# Patient Record
Sex: MALE | Race: WHITE | NOT HISPANIC OR LATINO | Employment: OTHER | URBAN - METROPOLITAN AREA
[De-identification: names, ages, dates, MRNs, and addresses within clinical notes are randomized per-mention and may not be internally consistent; named-entity substitution may affect disease eponyms.]

---

## 2023-05-08 DIAGNOSIS — F51.01 PRIMARY INSOMNIA: Primary | ICD-10-CM

## 2023-05-08 RX ORDER — HYDROXYZINE HYDROCHLORIDE 10 MG/1
TABLET, FILM COATED ORAL
Qty: 30 TABLET | Refills: 5 | Status: SHIPPED | OUTPATIENT
Start: 2023-05-08 | End: 2023-10-18 | Stop reason: ALTCHOICE

## 2023-05-18 DIAGNOSIS — I10 PRIMARY HYPERTENSION: Primary | ICD-10-CM

## 2023-05-18 RX ORDER — ATENOLOL 25 MG/1
TABLET ORAL
Qty: 90 TABLET | Refills: 3 | Status: SHIPPED | OUTPATIENT
Start: 2023-05-18 | End: 2023-07-26

## 2023-07-07 DIAGNOSIS — E78.2 MIXED HYPERLIPIDEMIA: Primary | ICD-10-CM

## 2023-07-10 RX ORDER — ATORVASTATIN CALCIUM 20 MG/1
TABLET, FILM COATED ORAL
Qty: 30 TABLET | Refills: 11 | Status: SHIPPED | OUTPATIENT
Start: 2023-07-10 | End: 2023-10-18 | Stop reason: SDUPTHER

## 2023-07-19 ASSESSMENT — ENCOUNTER SYMPTOMS
ORTHOPNEA: 0
SHORTNESS OF BREATH: 1
LEG PAIN: 0
HEMOPTYSIS: 0
HEADACHES: 0
FEVER: 0
NECK PAIN: 0
SPUTUM PRODUCTION: 0
RHINORRHEA: 0
SYNCOPE: 0
SWOLLEN GLANDS: 0
SORE THROAT: 0
ABDOMINAL PAIN: 0
WHEEZING: 1
CLAUDICATION: 0
PND: 0
VOMITING: 0

## 2023-07-25 PROBLEM — E03.9 HYPOTHYROIDISM (ACQUIRED): Status: ACTIVE | Noted: 2023-07-25

## 2023-07-25 PROBLEM — Z86.69 H/O GUILLAIN-BARRE SYNDROME: Status: ACTIVE | Noted: 2023-07-25

## 2023-07-25 PROBLEM — R60.0 EDEMA OF BOTH LEGS: Status: ACTIVE | Noted: 2023-07-25

## 2023-07-25 PROBLEM — I10 BENIGN ESSENTIAL HTN: Status: ACTIVE | Noted: 2023-07-25

## 2023-07-25 PROBLEM — M54.50 LOW BACK PAIN: Status: ACTIVE | Noted: 2023-07-25

## 2023-07-25 PROBLEM — I89.0 LYMPHEDEMA OF RIGHT ARM: Status: ACTIVE | Noted: 2023-07-25

## 2023-07-25 PROBLEM — G60.9 IDIOPATHIC PERIPHERAL NEUROPATHY: Status: ACTIVE | Noted: 2023-07-25

## 2023-07-25 PROBLEM — D64.9 ANEMIA: Status: ACTIVE | Noted: 2023-07-25

## 2023-07-25 PROBLEM — I50.9 CHF (CONGESTIVE HEART FAILURE) (MULTI): Status: ACTIVE | Noted: 2023-07-25

## 2023-07-25 PROBLEM — H90.3 BILATERAL SENSORINEURAL HEARING LOSS: Status: ACTIVE | Noted: 2023-07-25

## 2023-07-25 PROBLEM — M25.50 ARTHRALGIA OF MULTIPLE SITES: Status: ACTIVE | Noted: 2023-07-25

## 2023-07-25 PROBLEM — E78.5 HYPERLIPIDEMIA: Status: ACTIVE | Noted: 2023-07-25

## 2023-07-25 PROBLEM — G47.33 OBSTRUCTIVE SLEEP APNEA: Status: ACTIVE | Noted: 2023-07-25

## 2023-07-25 PROBLEM — G45.9 TIA (TRANSIENT ISCHEMIC ATTACK): Status: ACTIVE | Noted: 2023-07-25

## 2023-07-25 PROBLEM — R53.82 CHRONIC FATIGUE: Status: ACTIVE | Noted: 2023-07-25

## 2023-07-25 PROBLEM — Z86.73 H/O TIA (TRANSIENT ISCHEMIC ATTACK) AND STROKE: Status: ACTIVE | Noted: 2023-07-25

## 2023-07-25 PROBLEM — K21.9 GERD (GASTROESOPHAGEAL REFLUX DISEASE): Status: ACTIVE | Noted: 2023-07-25

## 2023-07-26 ENCOUNTER — OFFICE VISIT (OUTPATIENT)
Dept: PRIMARY CARE | Facility: CLINIC | Age: 88
End: 2023-07-26
Payer: MEDICARE

## 2023-07-26 VITALS
WEIGHT: 220.8 LBS | TEMPERATURE: 97.5 F | BODY MASS INDEX: 34.07 KG/M2 | SYSTOLIC BLOOD PRESSURE: 136 MMHG | DIASTOLIC BLOOD PRESSURE: 76 MMHG | OXYGEN SATURATION: 92 % | HEART RATE: 75 BPM

## 2023-07-26 DIAGNOSIS — E03.9 HYPOTHYROIDISM (ACQUIRED): ICD-10-CM

## 2023-07-26 DIAGNOSIS — I10 BENIGN ESSENTIAL HTN: ICD-10-CM

## 2023-07-26 DIAGNOSIS — I50.9 CONGESTIVE HEART FAILURE, UNSPECIFIED HF CHRONICITY, UNSPECIFIED HEART FAILURE TYPE (MULTI): Primary | ICD-10-CM

## 2023-07-26 DIAGNOSIS — K21.9 GASTROESOPHAGEAL REFLUX DISEASE WITHOUT ESOPHAGITIS: ICD-10-CM

## 2023-07-26 DIAGNOSIS — D64.9 ANEMIA, UNSPECIFIED TYPE: ICD-10-CM

## 2023-07-26 PROCEDURE — 3078F DIAST BP <80 MM HG: CPT | Performed by: FAMILY MEDICINE

## 2023-07-26 PROCEDURE — 3075F SYST BP GE 130 - 139MM HG: CPT | Performed by: FAMILY MEDICINE

## 2023-07-26 PROCEDURE — 1159F MED LIST DOCD IN RCRD: CPT | Performed by: FAMILY MEDICINE

## 2023-07-26 PROCEDURE — 1036F TOBACCO NON-USER: CPT | Performed by: FAMILY MEDICINE

## 2023-07-26 PROCEDURE — 99213 OFFICE O/P EST LOW 20 MIN: CPT | Performed by: FAMILY MEDICINE

## 2023-07-26 PROCEDURE — 1160F RVW MEDS BY RX/DR IN RCRD: CPT | Performed by: FAMILY MEDICINE

## 2023-07-26 RX ORDER — LEVOTHYROXINE SODIUM 100 UG/1
0.5 TABLET ORAL DAILY
COMMUNITY
Start: 2013-01-18 | End: 2024-03-04

## 2023-07-26 RX ORDER — LOSARTAN POTASSIUM 100 MG/1
1 TABLET ORAL DAILY
COMMUNITY
Start: 2014-05-05 | End: 2023-10-18 | Stop reason: SDUPTHER

## 2023-07-26 RX ORDER — AZELASTINE 1 MG/ML
SPRAY, METERED NASAL
COMMUNITY
Start: 2022-11-29

## 2023-07-26 RX ORDER — CLOPIDOGREL BISULFATE 75 MG/1
1 TABLET ORAL DAILY
COMMUNITY
Start: 2017-01-13 | End: 2023-10-18 | Stop reason: SDUPTHER

## 2023-07-26 RX ORDER — FLUTICASONE FUROATE, UMECLIDINIUM BROMIDE AND VILANTEROL TRIFENATATE 100; 62.5; 25 UG/1; UG/1; UG/1
1 POWDER RESPIRATORY (INHALATION)
COMMUNITY
Start: 2023-01-06

## 2023-07-26 RX ORDER — ASPIRIN 81 MG/1
81 TABLET ORAL
COMMUNITY
End: 2023-10-18 | Stop reason: SDUPTHER

## 2023-07-26 RX ORDER — OMEPRAZOLE 20 MG/1
1 CAPSULE, DELAYED RELEASE ORAL DAILY
COMMUNITY
End: 2023-10-18 | Stop reason: ALTCHOICE

## 2023-07-26 ASSESSMENT — ENCOUNTER SYMPTOMS
PND: 0
RHINORRHEA: 0
NECK PAIN: 0
ABDOMINAL PAIN: 0
HEMOPTYSIS: 0
SHORTNESS OF BREATH: 1
LOSS OF SENSATION IN FEET: 1
FEVER: 0
SYNCOPE: 0
LEG PAIN: 0
WHEEZING: 1
HEADACHES: 0
VOMITING: 0
DEPRESSION: 0
CLAUDICATION: 0
OCCASIONAL FEELINGS OF UNSTEADINESS: 1
SORE THROAT: 0
SPUTUM PRODUCTION: 0
ORTHOPNEA: 0
SWOLLEN GLANDS: 0

## 2023-07-26 ASSESSMENT — PATIENT HEALTH QUESTIONNAIRE - PHQ9
2. FEELING DOWN, DEPRESSED OR HOPELESS: NOT AT ALL
1. LITTLE INTEREST OR PLEASURE IN DOING THINGS: NOT AT ALL
SUM OF ALL RESPONSES TO PHQ9 QUESTIONS 1 AND 2: 0

## 2023-07-26 NOTE — PROGRESS NOTES
Subjective   Patient ID: Mic Saunders is a 88 y.o. male who presents for 6 month follow up.    He is here for 6 months follow-up on hypertension, CHF, GERD, hyperlipidemia, hypothyroidism, peripheral neuropathy, chronic lower back pain and obstructive sleep apnea.  In general he is feeling fine other than complaining of chronic fatigue.    Shortness of Breath  This is a chronic problem. The current episode started more than 1 year ago. The problem occurs daily. The problem has been unchanged. The average episode lasts 10 minutes. Associated symptoms include wheezing. Pertinent negatives include no abdominal pain, chest pain, claudication, coryza, ear pain, fever, headaches, hemoptysis, leg pain, leg swelling, neck pain, orthopnea, PND, rash, rhinorrhea, sore throat, sputum production, swollen glands, syncope or vomiting. The symptoms are aggravated by any activity.        Review of Systems   Constitutional:  Negative for fever.   HENT:  Negative for ear pain, rhinorrhea and sore throat.    Respiratory:  Positive for shortness of breath and wheezing. Negative for hemoptysis and sputum production.    Cardiovascular:  Negative for chest pain, orthopnea, claudication, leg swelling, syncope and PND.   Gastrointestinal:  Negative for abdominal pain and vomiting.   Musculoskeletal:  Negative for neck pain.   Skin:  Negative for rash.   Neurological:  Negative for headaches.       Objective   /76 (BP Location: Left arm, Patient Position: Sitting, BP Cuff Size: Adult)   Pulse 75   Temp 36.4 °C (97.5 °F) (Skin)   Wt 100 kg (220 lb 12.8 oz)   SpO2 92%   BMI 34.07 kg/m²     Physical Exam  Constitutional:       Appearance: Normal appearance.   HENT:      Head: Normocephalic and atraumatic.   Cardiovascular:      Rate and Rhythm: Normal rate and regular rhythm.   Pulmonary:      Effort: Pulmonary effort is normal.      Breath sounds: Normal breath sounds.   Musculoskeletal:      Cervical back: Normal range of motion and  neck supple.   Neurological:      General: No focal deficit present.      Mental Status: He is alert and oriented to person, place, and time.   Psychiatric:         Mood and Affect: Mood normal.         Behavior: Behavior normal.         Thought Content: Thought content normal.         Judgment: Judgment normal.         Assessment/Plan   Problem List Items Addressed This Visit       Anemia    Benign essential HTN    Relevant Orders    CBC    Lipid Panel    CHF (congestive heart failure) (CMS/[facility]) - Primary     Followed by cardiology         Relevant Medications    clopidogrel (Plavix) 75 mg tablet    Other Relevant Orders    Comprehensive Metabolic Panel    GERD (gastroesophageal reflux disease)    Hypothyroidism (acquired)    Relevant Orders    Thyroid Stimulating Hormone    Thyroxine, Free     In general he is doing well other than the chronic fatigue and he had blood work at his last visit.  He will follow-up in 6 months with one of the physicians here.

## 2023-08-14 ENCOUNTER — TELEPHONE (OUTPATIENT)
Dept: PRIMARY CARE | Facility: CLINIC | Age: 88
End: 2023-08-14
Payer: MEDICARE

## 2023-08-14 NOTE — TELEPHONE ENCOUNTER
Pt's son calling, states Mic is complaining of his legs, feet and hips hurting. He is on Plavix  and he took IB profen 600mg which did help but is a blood thinner, they want to know if there is anything else he can take to help w/ the pain that is not a blood thinner.

## 2023-10-18 ENCOUNTER — LAB (OUTPATIENT)
Dept: LAB | Facility: LAB | Age: 88
End: 2023-10-18
Payer: MEDICARE

## 2023-10-18 ENCOUNTER — OFFICE VISIT (OUTPATIENT)
Dept: PRIMARY CARE | Facility: CLINIC | Age: 88
End: 2023-10-18
Payer: MEDICARE

## 2023-10-18 VITALS
OXYGEN SATURATION: 94 % | SYSTOLIC BLOOD PRESSURE: 156 MMHG | HEART RATE: 84 BPM | TEMPERATURE: 97.7 F | WEIGHT: 221.2 LBS | DIASTOLIC BLOOD PRESSURE: 58 MMHG | BODY MASS INDEX: 34.13 KG/M2

## 2023-10-18 DIAGNOSIS — E03.9 HYPOTHYROIDISM (ACQUIRED): ICD-10-CM

## 2023-10-18 DIAGNOSIS — Z86.73 H/O TIA (TRANSIENT ISCHEMIC ATTACK) AND STROKE: ICD-10-CM

## 2023-10-18 DIAGNOSIS — K21.9 GASTROESOPHAGEAL REFLUX DISEASE WITHOUT ESOPHAGITIS: ICD-10-CM

## 2023-10-18 DIAGNOSIS — D64.9 ANEMIA, UNSPECIFIED TYPE: Primary | ICD-10-CM

## 2023-10-18 DIAGNOSIS — D64.9 ANEMIA, UNSPECIFIED TYPE: ICD-10-CM

## 2023-10-18 DIAGNOSIS — I50.9 CONGESTIVE HEART FAILURE, UNSPECIFIED HF CHRONICITY, UNSPECIFIED HEART FAILURE TYPE (MULTI): ICD-10-CM

## 2023-10-18 DIAGNOSIS — I89.0 LYMPHEDEMA OF RIGHT ARM: ICD-10-CM

## 2023-10-18 DIAGNOSIS — I10 BENIGN ESSENTIAL HTN: ICD-10-CM

## 2023-10-18 DIAGNOSIS — E78.2 MIXED HYPERLIPIDEMIA: ICD-10-CM

## 2023-10-18 LAB
ANION GAP SERPL CALC-SCNC: 12 MMOL/L (ref 10–20)
BUN SERPL-MCNC: 20 MG/DL (ref 6–23)
CALCIUM SERPL-MCNC: 9.3 MG/DL (ref 8.6–10.3)
CHLORIDE SERPL-SCNC: 102 MMOL/L (ref 98–107)
CO2 SERPL-SCNC: 22 MMOL/L (ref 21–32)
CREAT SERPL-MCNC: 1.08 MG/DL (ref 0.5–1.3)
ERYTHROCYTE [DISTWIDTH] IN BLOOD BY AUTOMATED COUNT: 17 % (ref 11.5–14.5)
FERRITIN SERPL-MCNC: 25 NG/ML (ref 20–300)
GFR SERPL CREATININE-BSD FRML MDRD: 66 ML/MIN/1.73M*2
GLUCOSE SERPL-MCNC: 112 MG/DL (ref 74–99)
HCT VFR BLD AUTO: 40.1 % (ref 41–52)
HGB BLD-MCNC: 12.5 G/DL (ref 13.5–17.5)
IRON SATN MFR SERPL: 6 % (ref 25–45)
IRON SERPL-MCNC: 31 UG/DL (ref 35–150)
MCH RBC QN AUTO: 25.7 PG (ref 26–34)
MCHC RBC AUTO-ENTMCNC: 31.2 G/DL (ref 32–36)
MCV RBC AUTO: 83 FL (ref 80–100)
NRBC BLD-RTO: 0 /100 WBCS (ref 0–0)
PLATELET # BLD AUTO: 267 X10*3/UL (ref 150–450)
PMV BLD AUTO: 11.3 FL (ref 7.5–11.5)
POTASSIUM SERPL-SCNC: 4.3 MMOL/L (ref 3.5–5.3)
RBC # BLD AUTO: 4.86 X10*6/UL (ref 4.5–5.9)
SODIUM SERPL-SCNC: 132 MMOL/L (ref 136–145)
TIBC SERPL-MCNC: 480 UG/DL (ref 240–445)
TSH SERPL-ACNC: 3.03 MIU/L (ref 0.44–3.98)
UIBC SERPL-MCNC: 449 UG/DL (ref 110–370)
WBC # BLD AUTO: 9.6 X10*3/UL (ref 4.4–11.3)

## 2023-10-18 PROCEDURE — 82728 ASSAY OF FERRITIN: CPT

## 2023-10-18 PROCEDURE — 36415 COLL VENOUS BLD VENIPUNCTURE: CPT

## 2023-10-18 PROCEDURE — 85027 COMPLETE CBC AUTOMATED: CPT

## 2023-10-18 PROCEDURE — 1160F RVW MEDS BY RX/DR IN RCRD: CPT | Performed by: NURSE PRACTITIONER

## 2023-10-18 PROCEDURE — 1036F TOBACCO NON-USER: CPT | Performed by: NURSE PRACTITIONER

## 2023-10-18 PROCEDURE — 3078F DIAST BP <80 MM HG: CPT | Performed by: NURSE PRACTITIONER

## 2023-10-18 PROCEDURE — 83540 ASSAY OF IRON: CPT

## 2023-10-18 PROCEDURE — 99214 OFFICE O/P EST MOD 30 MIN: CPT | Performed by: NURSE PRACTITIONER

## 2023-10-18 PROCEDURE — 80048 BASIC METABOLIC PNL TOTAL CA: CPT

## 2023-10-18 PROCEDURE — 1159F MED LIST DOCD IN RCRD: CPT | Performed by: NURSE PRACTITIONER

## 2023-10-18 PROCEDURE — 3077F SYST BP >= 140 MM HG: CPT | Performed by: NURSE PRACTITIONER

## 2023-10-18 PROCEDURE — 84443 ASSAY THYROID STIM HORMONE: CPT

## 2023-10-18 PROCEDURE — 83550 IRON BINDING TEST: CPT

## 2023-10-18 RX ORDER — CEPHALEXIN 500 MG/1
1 CAPSULE ORAL 3 TIMES DAILY
COMMUNITY
Start: 2019-12-03 | End: 2024-01-08 | Stop reason: ALTCHOICE

## 2023-10-18 RX ORDER — LOSARTAN POTASSIUM 100 MG/1
100 TABLET ORAL DAILY
Qty: 90 TABLET | Refills: 1 | Status: SHIPPED | OUTPATIENT
Start: 2023-10-18 | End: 2024-01-12

## 2023-10-18 RX ORDER — ASPIRIN 81 MG/1
81 TABLET ORAL
Qty: 90 TABLET | Refills: 1 | Status: SHIPPED | OUTPATIENT
Start: 2023-10-18

## 2023-10-18 RX ORDER — PANTOPRAZOLE SODIUM 40 MG/1
40 TABLET, DELAYED RELEASE ORAL DAILY
COMMUNITY
End: 2023-10-18 | Stop reason: SDUPTHER

## 2023-10-18 RX ORDER — ATORVASTATIN CALCIUM 20 MG/1
20 TABLET, FILM COATED ORAL DAILY
Qty: 90 TABLET | Refills: 1 | Status: SHIPPED | OUTPATIENT
Start: 2023-10-18

## 2023-10-18 RX ORDER — PANTOPRAZOLE SODIUM 40 MG/1
40 TABLET, DELAYED RELEASE ORAL DAILY
Qty: 90 TABLET | Refills: 1 | Status: SHIPPED | OUTPATIENT
Start: 2023-10-18 | End: 2024-03-19 | Stop reason: SDUPTHER

## 2023-10-18 RX ORDER — CLOPIDOGREL BISULFATE 75 MG/1
75 TABLET ORAL DAILY
Qty: 90 TABLET | Refills: 1 | Status: SHIPPED | OUTPATIENT
Start: 2023-10-18 | End: 2024-01-12

## 2023-10-18 ASSESSMENT — ENCOUNTER SYMPTOMS
CHILLS: 0
DIARRHEA: 0
FATIGUE: 1
ABDOMINAL PAIN: 0
DIZZINESS: 0
VOMITING: 0
FEVER: 0
CHEST TIGHTNESS: 0
COUGH: 0
NUMBNESS: 0
HEADACHES: 0
NAUSEA: 0
PALPITATIONS: 0

## 2023-10-18 ASSESSMENT — PATIENT HEALTH QUESTIONNAIRE - PHQ9
SUM OF ALL RESPONSES TO PHQ9 QUESTIONS 1 AND 2: 0
2. FEELING DOWN, DEPRESSED OR HOPELESS: NOT AT ALL
1. LITTLE INTEREST OR PLEASURE IN DOING THINGS: NOT AT ALL

## 2023-10-18 NOTE — PROGRESS NOTES
Subjective   Mic Saunders is a 88 y.o. male who presents for Transfer Of Care (From Dr Arevalo - Both shoulders have been giving him issues. He had his MCW in 01/2023 already.), Flu Vaccine (Pt denied), and Med Refill (Cephalexin ).    Med Refill  Associated symptoms include arthralgias, fatigue and weakness. Pertinent negatives include no abdominal pain, chest pain, chills, coughing, fever, headaches, nausea, numbness or vomiting.     He presents to the office today to establish care and medication refills and follow up.  He reports had GBS in 1976 2 weeks after Swine flu vaccine.  (+) fatigue  Tolerating medication well at current dose- no side effects. No chest pain, palpitations  (+) SOB with exertion-chronic  (+) chronic edema in feet. No headaches, weakness or vision changes.  (+) chronic numbness/tingling and pain in hands and feet since GBS  No dizziness.  Home blood pressure readings: 130's/60's  Last eye exam:1 year ago  Diet:Does not eat there healthiest  Exercise: limited due to weakness.  Weight: up about 10 lbs from January    He reports he has been having pain in the right side of neck into the shoulder.   Pain goes into the arm at times.  No numbness, tingling, weakness.  Tylenol helps at times.  Is not interested in PT today.    Last labs:     Follows with cardiology Dr. Bloom for PPM- had complete heart block.  Follows with Dr. Casillas for COPD    Review of Systems   Constitutional:  Positive for fatigue. Negative for chills and fever.   Eyes:  Negative for visual disturbance.   Respiratory:  Positive for shortness of breath. Negative for cough and chest tightness.    Cardiovascular:  Negative for chest pain, palpitations and leg swelling.   Gastrointestinal:  Negative for abdominal pain, diarrhea, nausea and vomiting.   Musculoskeletal:  Positive for arthralgias.   Neurological:  Positive for weakness. Negative for dizziness, numbness and headaches.       Objective   /58 (BP Location: Left  arm, Patient Position: Sitting)   Pulse 84   Temp 36.5 °C (97.7 °F) (Temporal)   Wt 100 kg (221 lb 3.2 oz)   SpO2 94%   BMI 34.13 kg/m²     Physical Exam  Constitutional:       General: He is not in acute distress.     Appearance: Normal appearance. He is not toxic-appearing.   Eyes:      Extraocular Movements: Extraocular movements intact.      Conjunctiva/sclera: Conjunctivae normal.      Pupils: Pupils are equal, round, and reactive to light.   Neck:      Vascular: No carotid bruit.   Cardiovascular:      Rate and Rhythm: Normal rate and regular rhythm.      Pulses: Normal pulses.      Heart sounds: Normal heart sounds, S1 normal and S2 normal. No murmur heard.     Comments: Trace edema noted to bilateral lower extremities  Pulmonary:      Effort: Pulmonary effort is normal. No respiratory distress.      Breath sounds: Normal breath sounds and air entry.   Abdominal:      General: Bowel sounds are normal.      Palpations: Abdomen is soft.      Tenderness: There is no abdominal tenderness.   Musculoskeletal:      Right lower leg: No edema.      Left lower leg: No edema.   Lymphadenopathy:      Cervical: No cervical adenopathy.   Neurological:      Mental Status: He is alert and oriented to person, place, and time.   Psychiatric:         Attention and Perception: Attention normal.         Mood and Affect: Mood and affect normal.         Behavior: Behavior normal. Behavior is cooperative.         Thought Content: Thought content normal.         Cognition and Memory: Cognition normal.         Judgment: Judgment normal.         Assessment/Plan   Problem List Items Addressed This Visit       Anemia - Primary     Recheck labs.  Last set of labs were after his permanent pacemaker placement.         Relevant Orders    CBC (Completed)    Iron and TIBC (Completed)    Ferritin (Completed)    Benign essential HTN     Elevated today but home readings have been good.          Relevant Medications    losartan (Cozaar) 100  mg tablet    Other Relevant Orders    Basic Metabolic Panel (Completed)    CHF (congestive heart failure) (CMS/HCC)     Clinically stable. Follows with cardiology.         Relevant Medications    clopidogrel (Plavix) 75 mg tablet    GERD (gastroesophageal reflux disease)     Fairly stable on Protonix.  Continue.         Relevant Medications    pantoprazole (ProtoNix) 40 mg EC tablet    H/O TIA (transient ischemic attack) and stroke    Relevant Medications    aspirin 81 mg EC tablet    atorvastatin (Lipitor) 20 mg tablet    clopidogrel (Plavix) 75 mg tablet    Hyperlipidemia     Continue statin.         Relevant Medications    atorvastatin (Lipitor) 20 mg tablet    Hypothyroidism (acquired)     Continue Levothyroxine at current dose. Check TSH.         Relevant Orders    TSH with reflex to Free T4 if abnormal (Completed)    Lymphedema of right arm       It has been a pleasure seeing you today!

## 2023-10-18 NOTE — PATIENT INSTRUCTIONS
Obtain the blood work as ordered today.   Plan to follow up in January for Annual Wellness Visit.

## 2023-10-19 ASSESSMENT — ENCOUNTER SYMPTOMS
WEAKNESS: 1
SHORTNESS OF BREATH: 1
ARTHRALGIAS: 1

## 2024-01-08 ENCOUNTER — OFFICE VISIT (OUTPATIENT)
Dept: PRIMARY CARE | Facility: CLINIC | Age: 89
End: 2024-01-08
Payer: MEDICARE

## 2024-01-08 VITALS
TEMPERATURE: 98 F | WEIGHT: 227.2 LBS | DIASTOLIC BLOOD PRESSURE: 60 MMHG | BODY MASS INDEX: 35.06 KG/M2 | OXYGEN SATURATION: 98 % | SYSTOLIC BLOOD PRESSURE: 136 MMHG | HEART RATE: 74 BPM

## 2024-01-08 DIAGNOSIS — E03.9 HYPOTHYROIDISM (ACQUIRED): ICD-10-CM

## 2024-01-08 DIAGNOSIS — M25.561 CHRONIC PAIN OF RIGHT KNEE: ICD-10-CM

## 2024-01-08 DIAGNOSIS — E66.01 OBESITY, MORBID (MULTI): ICD-10-CM

## 2024-01-08 DIAGNOSIS — E87.1 HYPONATREMIA: ICD-10-CM

## 2024-01-08 DIAGNOSIS — I10 BENIGN ESSENTIAL HTN: ICD-10-CM

## 2024-01-08 DIAGNOSIS — D64.9 ANEMIA, UNSPECIFIED TYPE: ICD-10-CM

## 2024-01-08 DIAGNOSIS — I89.0 LYMPHEDEMA OF RIGHT ARM: ICD-10-CM

## 2024-01-08 DIAGNOSIS — K21.9 GASTROESOPHAGEAL REFLUX DISEASE WITHOUT ESOPHAGITIS: ICD-10-CM

## 2024-01-08 DIAGNOSIS — G89.29 CHRONIC PAIN OF RIGHT KNEE: ICD-10-CM

## 2024-01-08 DIAGNOSIS — E78.5 HYPERLIPIDEMIA, UNSPECIFIED HYPERLIPIDEMIA TYPE: ICD-10-CM

## 2024-01-08 DIAGNOSIS — J44.9 CHRONIC OBSTRUCTIVE PULMONARY DISEASE, UNSPECIFIED COPD TYPE (MULTI): ICD-10-CM

## 2024-01-08 DIAGNOSIS — I50.9 CONGESTIVE HEART FAILURE, UNSPECIFIED HF CHRONICITY, UNSPECIFIED HEART FAILURE TYPE (MULTI): ICD-10-CM

## 2024-01-08 DIAGNOSIS — G47.33 OBSTRUCTIVE SLEEP APNEA: ICD-10-CM

## 2024-01-08 DIAGNOSIS — Z00.00 MEDICARE ANNUAL WELLNESS VISIT, SUBSEQUENT: Primary | ICD-10-CM

## 2024-01-08 PROBLEM — I51.7 LVH (LEFT VENTRICULAR HYPERTROPHY): Status: ACTIVE | Noted: 2018-02-13

## 2024-01-08 PROBLEM — N40.0 BPH (BENIGN PROSTATIC HYPERPLASIA): Status: RESOLVED | Noted: 2018-02-13 | Resolved: 2024-01-08

## 2024-01-08 PROBLEM — J42 CHRONIC BRONCHITIS (MULTI): Status: ACTIVE | Noted: 2024-01-08

## 2024-01-08 PROBLEM — N40.0 BPH (BENIGN PROSTATIC HYPERPLASIA): Status: ACTIVE | Noted: 2018-02-13

## 2024-01-08 PROBLEM — I51.89 DIASTOLIC DYSFUNCTION: Status: ACTIVE | Noted: 2018-02-13

## 2024-01-08 PROBLEM — J42 CHRONIC BRONCHITIS (MULTI): Status: RESOLVED | Noted: 2024-01-08 | Resolved: 2024-01-08

## 2024-01-08 PROCEDURE — 1159F MED LIST DOCD IN RCRD: CPT | Performed by: NURSE PRACTITIONER

## 2024-01-08 PROCEDURE — 1170F FXNL STATUS ASSESSED: CPT | Performed by: NURSE PRACTITIONER

## 2024-01-08 PROCEDURE — 1036F TOBACCO NON-USER: CPT | Performed by: NURSE PRACTITIONER

## 2024-01-08 PROCEDURE — 1160F RVW MEDS BY RX/DR IN RCRD: CPT | Performed by: NURSE PRACTITIONER

## 2024-01-08 PROCEDURE — 3078F DIAST BP <80 MM HG: CPT | Performed by: NURSE PRACTITIONER

## 2024-01-08 PROCEDURE — 3075F SYST BP GE 130 - 139MM HG: CPT | Performed by: NURSE PRACTITIONER

## 2024-01-08 PROCEDURE — G0439 PPPS, SUBSEQ VISIT: HCPCS | Performed by: NURSE PRACTITIONER

## 2024-01-08 PROCEDURE — 99214 OFFICE O/P EST MOD 30 MIN: CPT | Performed by: NURSE PRACTITIONER

## 2024-01-08 ASSESSMENT — ENCOUNTER SYMPTOMS
BLOOD IN STOOL: 0
FATIGUE: 1
PALPITATIONS: 0
NUMBNESS: 1
ARTHRALGIAS: 1
VOMITING: 0
DIZZINESS: 0
ABDOMINAL PAIN: 0
SHORTNESS OF BREATH: 1
COUGH: 0
DYSPHORIC MOOD: 0
FEVER: 0
CHEST TIGHTNESS: 0
WEAKNESS: 0
NAUSEA: 0
DIARRHEA: 0
HEADACHES: 0
CHILLS: 0

## 2024-01-08 ASSESSMENT — PATIENT HEALTH QUESTIONNAIRE - PHQ9
SUM OF ALL RESPONSES TO PHQ9 QUESTIONS 1 AND 2: 0
2. FEELING DOWN, DEPRESSED OR HOPELESS: NOT AT ALL
2. FEELING DOWN, DEPRESSED OR HOPELESS: NOT AT ALL
1. LITTLE INTEREST OR PLEASURE IN DOING THINGS: NOT AT ALL
SUM OF ALL RESPONSES TO PHQ9 QUESTIONS 1 AND 2: 0
1. LITTLE INTEREST OR PLEASURE IN DOING THINGS: NOT AT ALL

## 2024-01-08 ASSESSMENT — ACTIVITIES OF DAILY LIVING (ADL)
GROCERY_SHOPPING: TOTAL CARE
BATHING: INDEPENDENT
DOING_HOUSEWORK: TOTAL CARE
MANAGING_FINANCES: TOTAL CARE
TAKING_MEDICATION: NEEDS ASSISTANCE
DRESSING: NEEDS ASSISTANCE

## 2024-01-08 NOTE — PROGRESS NOTES
Subjective   Reason for Visit: Mic Saunders is an 89 y.o. male here for a Medicare Wellness visit.     Past Medical, Surgical, and Family History reviewed and updated in chart.    Reviewed all medications by prescribing practitioner or clinical pharmacist (such as prescriptions, OTCs, herbal therapies and supplements) and documented in the medical record.    HPI  He presents to the office today for AWV and follow up.  He is tolerating medications well at current dose- no side effects. No chest pain, palpitations or edema. (+) shortness of breath with exertion (at baseline.) No headaches, numbness, tingling, weakness or vision changes.  No dizziness.  Appetite has not been the best.    He reports he has had some pain in the right knee and leg for a couple years.  One day walked out into the kitchen and slipped on the wet floor landing directly on the right knee.  He reports he is able to bear full weight but painful.  Reports it affects the whole leg.  No swelling.  No numbness/tingling/weakness.  He is still interested in physical therapy at this time.    He also has chronic right shoulder pain.  Has limited range of motion of both shoulders.  Declines PT.  Last eye exam: 3/2023  Diet: Not the best- a lot of meat and potatoes  Exercise: No scheduled exercise.  Weight: up about 6 lbs since October    Follows with cardiology Dr. Bloom for PPM- had complete heart block.  Follows with Dr. Casillas for COPD      Patient Care Team:  GINO Luu-CNP as PCP - General (Family Medicine)  Earl Arevalo MD as PCP - [employer] PCP     Review of Systems   Constitutional:  Positive for fatigue. Negative for chills and fever.   Eyes:  Negative for visual disturbance.   Respiratory:  Positive for shortness of breath. Negative for cough and chest tightness.    Cardiovascular:  Negative for chest pain, palpitations and leg swelling.   Gastrointestinal:  Negative for abdominal pain, blood in stool,  diarrhea, nausea and vomiting.   Musculoskeletal:  Positive for arthralgias.   Neurological:  Positive for numbness. Negative for dizziness, weakness and headaches.   Psychiatric/Behavioral:  Negative for dysphoric mood.      Objective   Vitals:  /60 (BP Location: Left arm, Patient Position: Sitting)   Pulse 74   Temp 36.7 °C (98 °F) (Temporal)   Wt 103 kg (227 lb 3.2 oz)   SpO2 98%   BMI 35.06 kg/m²       Physical Exam  Constitutional:       General: He is not in acute distress.     Appearance: Normal appearance. He is not toxic-appearing.   Eyes:      Extraocular Movements: Extraocular movements intact.      Conjunctiva/sclera: Conjunctivae normal.      Pupils: Pupils are equal, round, and reactive to light.   Cardiovascular:      Rate and Rhythm: Normal rate and regular rhythm.      Pulses: Normal pulses.      Heart sounds: Normal heart sounds, S1 normal and S2 normal. No murmur heard.  Pulmonary:      Effort: Pulmonary effort is normal. No respiratory distress.      Breath sounds: Normal breath sounds and air entry.   Abdominal:      General: Bowel sounds are normal.      Palpations: Abdomen is soft.      Tenderness: There is no abdominal tenderness.   Musculoskeletal:      Right knee: Bony tenderness and crepitus present. Decreased range of motion.      Left knee: No bony tenderness or crepitus. Decreased range of motion. No tenderness.      Right lower leg: No edema.      Left lower leg: No edema.   Lymphadenopathy:      Cervical: No cervical adenopathy.   Neurological:      Mental Status: He is alert and oriented to person, place, and time.   Psychiatric:         Attention and Perception: Attention normal.         Mood and Affect: Mood and affect normal.         Behavior: Behavior normal. Behavior is cooperative.         Thought Content: Thought content normal.         Cognition and Memory: Cognition normal.         Judgment: Judgment normal.       Assessment/Plan   Problem List Items Addressed  This Visit       Anemia    Current Assessment & Plan     Overall improving.  Discussed further workup of iron deficiency anemia including seeing GI for possible scopes.  He is not interested in further evaluation at this time.         Relevant Orders    CBC    Benign essential HTN    Current Assessment & Plan     Well-controlled on current medications.  Continue.  Follows with cardiology.         CHF (congestive heart failure) (CMS/[facility])    Current Assessment & Plan     Clinically stable.         GERD (gastroesophageal reflux disease)    Current Assessment & Plan     Stable on the current dose of Protonix.         Hyperlipidemia    Current Assessment & Plan     Continue statin.  Check FLP today.         Relevant Orders    Lipid Panel    Hypothyroidism (acquired)    Current Assessment & Plan     Continue current dose of levothyroxine.  Check TSH level.         Lymphedema of right arm    Obstructive sleep apnea    Current Assessment & Plan     Follows with pulmonology.         Chronic obstructive pulmonary disease (CMS/[facility])    Current Assessment & Plan     Clinically stable.  Follows with pulmonology.         Medicare annual wellness visit, subsequent - Primary    Current Assessment & Plan     Up-to-date on recommended age-appropriate screenings. No vaccines given history of GBS.         Hyponatremia    Current Assessment & Plan     Recheck BMP today.         Relevant Orders    Basic Metabolic Panel    Chronic pain of right knee    Current Assessment & Plan     Check right knee x-ray today.         Relevant Orders    XR knee right 3 views    Obesity, morbid (CMS/[facility])    Current Assessment & Plan     Discussed focusing on healthy diet today.

## 2024-01-08 NOTE — PATIENT INSTRUCTIONS
Obtain the x-ray and fasting labs as ordered today.  Continue to follow with specialists.  Plan to follow up in 6 months or sooner if needed.

## 2024-01-09 NOTE — ASSESSMENT & PLAN NOTE
Overall improving.  Discussed further workup of iron deficiency anemia including seeing GI for possible scopes.  He is not interested in further evaluation at this time.

## 2024-01-12 DIAGNOSIS — Z86.73 H/O TIA (TRANSIENT ISCHEMIC ATTACK) AND STROKE: ICD-10-CM

## 2024-01-12 DIAGNOSIS — I10 BENIGN ESSENTIAL HTN: ICD-10-CM

## 2024-01-12 RX ORDER — CLOPIDOGREL BISULFATE 75 MG/1
75 TABLET ORAL DAILY
Qty: 90 TABLET | Refills: 1 | Status: SHIPPED | OUTPATIENT
Start: 2024-01-12

## 2024-01-12 RX ORDER — LOSARTAN POTASSIUM 100 MG/1
100 TABLET ORAL DAILY
Qty: 90 TABLET | Refills: 1 | Status: SHIPPED | OUTPATIENT
Start: 2024-01-12

## 2024-01-17 ENCOUNTER — ANCILLARY PROCEDURE (OUTPATIENT)
Dept: RADIOLOGY | Facility: CLINIC | Age: 89
End: 2024-01-17
Payer: MEDICARE

## 2024-01-17 DIAGNOSIS — M25.561 CHRONIC PAIN OF RIGHT KNEE: ICD-10-CM

## 2024-01-17 DIAGNOSIS — G89.29 CHRONIC PAIN OF RIGHT KNEE: ICD-10-CM

## 2024-01-17 PROCEDURE — 73562 X-RAY EXAM OF KNEE 3: CPT | Mod: RT

## 2024-01-17 PROCEDURE — 73562 X-RAY EXAM OF KNEE 3: CPT | Mod: RIGHT SIDE | Performed by: RADIOLOGY

## 2024-01-23 DIAGNOSIS — M87.061: Primary | ICD-10-CM

## 2024-02-05 ENCOUNTER — LAB (OUTPATIENT)
Dept: LAB | Facility: LAB | Age: 89
End: 2024-02-05
Payer: MEDICARE

## 2024-02-05 DIAGNOSIS — E87.1 HYPONATREMIA: ICD-10-CM

## 2024-02-05 DIAGNOSIS — E78.5 HYPERLIPIDEMIA, UNSPECIFIED HYPERLIPIDEMIA TYPE: ICD-10-CM

## 2024-02-05 DIAGNOSIS — D64.9 ANEMIA, UNSPECIFIED TYPE: ICD-10-CM

## 2024-02-05 LAB
ANION GAP SERPL CALC-SCNC: 12 MMOL/L (ref 10–20)
BUN SERPL-MCNC: 17 MG/DL (ref 6–23)
CALCIUM SERPL-MCNC: 9.2 MG/DL (ref 8.6–10.3)
CHLORIDE SERPL-SCNC: 102 MMOL/L (ref 98–107)
CHOLEST SERPL-MCNC: 160 MG/DL (ref 0–199)
CHOLESTEROL/HDL RATIO: 3.1
CO2 SERPL-SCNC: 26 MMOL/L (ref 21–32)
CREAT SERPL-MCNC: 1.26 MG/DL (ref 0.5–1.3)
EGFRCR SERPLBLD CKD-EPI 2021: 55 ML/MIN/1.73M*2
ERYTHROCYTE [DISTWIDTH] IN BLOOD BY AUTOMATED COUNT: 17.4 % (ref 11.5–14.5)
GLUCOSE SERPL-MCNC: 111 MG/DL (ref 74–99)
HCT VFR BLD AUTO: 39.8 % (ref 41–52)
HDLC SERPL-MCNC: 51.8 MG/DL
HGB BLD-MCNC: 11.5 G/DL (ref 13.5–17.5)
LDLC SERPL CALC-MCNC: 79 MG/DL
MCH RBC QN AUTO: 23.2 PG (ref 26–34)
MCHC RBC AUTO-ENTMCNC: 28.9 G/DL (ref 32–36)
MCV RBC AUTO: 80 FL (ref 80–100)
NON HDL CHOLESTEROL: 108 MG/DL (ref 0–149)
NRBC BLD-RTO: 0 /100 WBCS (ref 0–0)
PLATELET # BLD AUTO: 269 X10*3/UL (ref 150–450)
POTASSIUM SERPL-SCNC: 4.6 MMOL/L (ref 3.5–5.3)
RBC # BLD AUTO: 4.96 X10*6/UL (ref 4.5–5.9)
SODIUM SERPL-SCNC: 135 MMOL/L (ref 136–145)
TRIGL SERPL-MCNC: 147 MG/DL (ref 0–149)
VLDL: 29 MG/DL (ref 0–40)
WBC # BLD AUTO: 8.2 X10*3/UL (ref 4.4–11.3)

## 2024-02-05 PROCEDURE — 36415 COLL VENOUS BLD VENIPUNCTURE: CPT

## 2024-02-05 PROCEDURE — 80048 BASIC METABOLIC PNL TOTAL CA: CPT

## 2024-02-05 PROCEDURE — 85027 COMPLETE CBC AUTOMATED: CPT

## 2024-02-05 PROCEDURE — 80061 LIPID PANEL: CPT

## 2024-02-12 ENCOUNTER — TELEPHONE (OUTPATIENT)
Dept: PRIMARY CARE | Facility: CLINIC | Age: 89
End: 2024-02-12
Payer: MEDICARE

## 2024-02-12 NOTE — TELEPHONE ENCOUNTER
Per HIPAA ok to leave detailed message, LM on Denny (POA) voicemail with results. He should call back if he would like to further evaluate.

## 2024-02-12 NOTE — TELEPHONE ENCOUNTER
----- Message from GINO Luu-CNP sent at 2/9/2024 11:01 PM EST -----  Please let him know his labs are stable.  Cholesterol looks good.  Still has an iron deficiency anemia.  It is down slightly from the blood work 3 months ago but better than labs in early 2023..  It looks like it has slowly trended down since 2021.  Would he like to further evaluate for the cause?

## 2024-03-01 DIAGNOSIS — E03.9 HYPOTHYROIDISM (ACQUIRED): Primary | ICD-10-CM

## 2024-03-04 ENCOUNTER — OFFICE VISIT (OUTPATIENT)
Dept: ORTHOPEDIC SURGERY | Facility: CLINIC | Age: 89
End: 2024-03-04
Payer: MEDICARE

## 2024-03-04 VITALS — BODY MASS INDEX: 34.4 KG/M2 | WEIGHT: 227 LBS | HEIGHT: 68 IN

## 2024-03-04 DIAGNOSIS — M17.31 POST-TRAUMATIC OSTEOARTHRITIS OF RIGHT KNEE: Primary | ICD-10-CM

## 2024-03-04 PROCEDURE — 1159F MED LIST DOCD IN RCRD: CPT | Performed by: STUDENT IN AN ORGANIZED HEALTH CARE EDUCATION/TRAINING PROGRAM

## 2024-03-04 PROCEDURE — 1036F TOBACCO NON-USER: CPT | Performed by: STUDENT IN AN ORGANIZED HEALTH CARE EDUCATION/TRAINING PROGRAM

## 2024-03-04 PROCEDURE — 1125F AMNT PAIN NOTED PAIN PRSNT: CPT | Performed by: STUDENT IN AN ORGANIZED HEALTH CARE EDUCATION/TRAINING PROGRAM

## 2024-03-04 PROCEDURE — 1157F ADVNC CARE PLAN IN RCRD: CPT | Performed by: STUDENT IN AN ORGANIZED HEALTH CARE EDUCATION/TRAINING PROGRAM

## 2024-03-04 PROCEDURE — 99204 OFFICE O/P NEW MOD 45 MIN: CPT | Performed by: STUDENT IN AN ORGANIZED HEALTH CARE EDUCATION/TRAINING PROGRAM

## 2024-03-04 PROCEDURE — 1160F RVW MEDS BY RX/DR IN RCRD: CPT | Performed by: STUDENT IN AN ORGANIZED HEALTH CARE EDUCATION/TRAINING PROGRAM

## 2024-03-04 PROCEDURE — 20610 DRAIN/INJ JOINT/BURSA W/O US: CPT | Performed by: STUDENT IN AN ORGANIZED HEALTH CARE EDUCATION/TRAINING PROGRAM

## 2024-03-04 RX ORDER — LEVOTHYROXINE SODIUM 100 UG/1
TABLET ORAL
Qty: 45 TABLET | Refills: 1 | Status: SHIPPED | OUTPATIENT
Start: 2024-03-04

## 2024-03-04 RX ADMIN — LIDOCAINE HYDROCHLORIDE 2 ML: 10 INJECTION INFILTRATION; PERINEURAL at 13:30

## 2024-03-04 RX ADMIN — TRIAMCINOLONE ACETONIDE 40 MG: 40 INJECTION, SUSPENSION INTRA-ARTICULAR; INTRAMUSCULAR at 13:30

## 2024-03-04 ASSESSMENT — PAIN - FUNCTIONAL ASSESSMENT: PAIN_FUNCTIONAL_ASSESSMENT: 0-10

## 2024-03-04 ASSESSMENT — PAIN SCALES - GENERAL: PAINLEVEL_OUTOF10: 5 - MODERATE PAIN

## 2024-03-04 NOTE — TELEPHONE ENCOUNTER
"Talked to his son \"britney\" and he said he takes 50mcg and he does not cut them in half. He said he takes it every other day as well  "

## 2024-03-04 NOTE — PROGRESS NOTES
PRIMARY CARE PHYSICIAN: Linda Vila, APRN-CNP  REFERRING PROVIDER: No referring provider defined for this encounter.     CONSULT ORTHOPAEDIC: Knee Evaluation    ASSESSMENT & PLAN    Impression: 89 y.o. male with right knee pain secondary to avascular necrosis and early degenerative changes.    Plan:   I explained to the patient the nature of their diagnosis.  I reviewed their imaging studies with them.    Based on the history, physical exam and imaging studies above, the patient's presentation is consistent with the above diagnosis.  I had a long discussion with the patient regarding their presentation and the treatment options.  We discussed initial nonoperative versus operative management options as well as potential further diagnostic imaging.  I reviewed the patient's x-ray findings with him.  He does have findings consistent with avascular necrosis and early degenerative changes.  We discussed treatment options going forward including initial nonoperative management.  I provided him with a corticosteroid injection as described below which she tolerated well.  He will focus on low impact activities.  I offered him physical therapy versus home exercises with the patient notes he is not interested and will do things on his own at home.    Follow-Up: Patient will follow-up with me as needed    At the end of the visit, all questions were answered in full. The patient is in agreement with the plan and recommendations. They will call the office with any questions/concerns.    Note dictated with [employer] software. Completed without full typed error editing and sent to avoid delay.       SUBJECTIVE  CHIEF COMPLAINT:   Chief Complaint   Patient presents with    Right Knee - Pain        HPI: Mic Saunders is a 89 y.o. patient who presents today with right knee pain. Pain has been going on for 3 years.  He notes that the knee pain bothers him in all of his daily activities.  He has difficulty  ambulating due to the pain.  He has pain at night.  He has pain with any sort of increased activity.  He states that he previously fell directly onto his knee a number of years ago and his knee pain has progressively worsened since.    They deny any constant or progressive numbness or tingling in their legs.     REVIEW OF SYSTEMS  Constitutional: See HPI for pain assessment, No significant weight loss, recent trauma  Cardiovascular: No chest pain, shortness of breath  Respiratory: No difficulty breathing, cough  Gastrointestinal: No nausea, vomiting, diarrhea, constipation  Musculoskeletal: Noted in HPI, positive for pain, restricted motion, stiffness  Integumentary: No rashes, easy bruising, redness   Neurological: no numbness or tingling in extremities, no gait disturbances   Psychiatric: No mood changes, memory changes, social issues  Heme/Lymph: no excessive swelling, easy bruising, excessive bleeding  ENT: No hearing changes  Eyes: No vision changes    Past Medical History:   Diagnosis Date    Acute bronchitis due to other specified organisms 03/19/2019    Acute bacterial bronchitis    Anemia     Benign paroxysmal vertigo, unspecified ear 08/02/2019    Benign paroxysmal positional vertigo    BPH (benign prostatic hyperplasia) 02/13/2018    Cataract     Chronic fatigue, unspecified 03/10/2016    Chronic fatigue    Chronic rhinitis 05/31/2018    Rhinitis    COPD (chronic obstructive pulmonary disease) (CMS/[facility])     Cramp and spasm 05/05/2016    Muscle cramps at night    Dermatitis, unspecified 07/21/2013    Dermatitis, eczematoid    Disease of thyroid gland     Disorder of thyroid, unspecified     Thyroid trouble    Effusion, unspecified ankle 09/25/2018    Ankle edema    Emphysema of lung (CMS/[facility])     Encounter for preprocedural laboratory examination     Blood tests prior to treatment or procedure    Erythema intertrigo 07/21/2013    Intertrigo    Generalized hyperhidrosis 07/21/2013    Excessive sweating     GERD (gastroesophageal reflux disease)     Heart disease     HL (hearing loss)     Hypertension     Impacted cerumen, bilateral 08/05/2019    Excessive cerumen in both ear canals    Left lower quadrant pain 10/17/2014    Abdominal pain, LLQ (left lower quadrant)    Lymphedema, not elsewhere classified 03/09/2017    Lymphedema of arm    Malignant neoplasm of prostate (CMS/HCC)     Prostate cancer    Meniere's disease, 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    Meniere's disease    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    Narcolepsy    Neuromuscular disorder (CMS/[facility])     Other complications following immunization, not elsewhere classified, initial encounter 10/25/2016    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    Other conditions influencing health status 12/22/2015    History of cough    Other postherpetic nervous system involvement 07/21/2013    Postherpetic neuralgia    Other specified soft tissue disorders 05/08/2019    Other disorders of soft tissue    Personal history of diseases of the skin and subcutaneous tissue 04/13/2017    History of actinic keratosis    Personal history of malignant melanoma of skin 10/25/2016    History of malignant melanoma    Personal history of malignant neoplasm of prostate     History of malignant neoplasm of prostate    Personal history of other diseases of the circulatory system     History of hypertension    Personal history of other diseases of the digestive system 03/10/2016    History of rectal bleeding    Personal history of other diseases of the respiratory system 08/04/2014    History of upper respiratory infection    Personal history of other diseases of the respiratory system 09/14/2015    History of sinusitis    Personal history of other diseases of the respiratory system 12/07/2015    History of bronchitis    Personal history of other specified conditions 10/17/2018    History of dysarthria    Personal history of other specified conditions 02/27/2015    History of short  term memory loss    Personal history of other specified conditions 2017    History of persistent cough    Personal history of other specified conditions 2017    History of chronic fatigue    Personal history of other specified conditions 2015    History of hemoptysis    Pressure ulcer of unspecified buttock, unspecified stage     Pressure sore on buttocks    Sciatica, left side 10/18/2016    Sciatica of left side    Tachypnea, not elsewhere classified 2016    Tachypnea    Viral wart, unspecified 2013    Warts    Visual impairment         Allergies   Allergen Reactions    Shellfish Derived Headache and Shortness of breath    Metoprolol Dizziness    [place] Other        Past Surgical History:   Procedure Laterality Date    EYE SURGERY      OTHER SURGICAL HISTORY  05/15/2014    Biopsy Nasal Cavity    OTHER SURGICAL HISTORY  03/10/2014    Skin Tag Removal    PROSTATE SURGERY  03/10/2014    Prostate Surgery    TONSILLECTOMY  05/15/2014    Tonsillectomy    VASECTOMY          Family History   Problem Relation Name Age of Onset    Hearing loss Mother      Other (rheumatic heart disease [Other]) Father Denny Saunders     Heart disease Father Denny Saunders         Social History     Socioeconomic History    Marital status:      Spouse name: Not on file    Number of children: Not on file    Years of education: Not on file    Highest education level: Not on file   Occupational History    Not on file   Tobacco Use    Smoking status: Former     Packs/day: 1.50     Years: 29.00     Additional pack years: 0.00     Total pack years: 43.50     Types: Cigarettes     Quit date:      Years since quittin.2     Passive exposure: Past    Smokeless tobacco: Never   Vaping Use    Vaping Use: Never used   Substance and Sexual Activity    Alcohol use: Yes     Alcohol/week: 1.0 standard drink of alcohol     Types: 1 Standard drinks or equivalent per week     Comment: rarely    Drug use: Never    Sexual  "activity: Not Currently     Partners: Female   Other Topics Concern    Not on file   Social History Narrative    Not on file     Social Determinants of Health     Financial Resource Strain: Not on file   Food Insecurity: Not on file   Transportation Needs: Not on file   Physical Activity: Not on file   Stress: Not on file   Social Connections: Not on file   Intimate Partner Violence: Not on file   Housing Stability: Not on file        CURRENT MEDICATIONS:   Current Outpatient Medications   Medication Sig Dispense Refill    clopidogrel (Plavix) 75 mg tablet TAKE 1 TABLET BY MOUTH EVERY DAY 90 tablet 1    losartan (Cozaar) 100 mg tablet TAKE 1 TABLET BY MOUTH EVERY DAY 90 tablet 1    aspirin 81 mg EC tablet Take 1 tablet (81 mg) by mouth once daily. 90 tablet 1    atorvastatin (Lipitor) 20 mg tablet Take 1 tablet (20 mg) by mouth once daily. 90 tablet 1    azelastine (Astelin) 137 mcg (0.1 %) nasal spray SPRAY ONE TIME IN EACH NOSTRIL TWICE DAILY      levothyroxine (Synthroid, Levoxyl) 100 mcg tablet Take 0.5 tablets (50 mcg) by mouth once daily.      pantoprazole (ProtoNix) 40 mg EC tablet Take 1 tablet (40 mg) by mouth once daily. 90 tablet 1    Trelegy Ellipta 100-62.5-25 mcg blister with device Inhale 1 puff once daily.       No current facility-administered medications for this visit.        OBJECTIVE    PHYSICAL EXAM      9/13/2021     9:37 AM 12/15/2022     3:08 PM 1/24/2023     9:24 AM 7/26/2023    10:05 AM 10/18/2023     2:20 PM 1/8/2024     1:09 PM 3/4/2024     1:09 PM   Vitals   Systolic 140 122 110 136 156 136    Diastolic 66 74 60 76 58 60    Heart Rate   67 75 84 74    Temp 36.7 °C (98.1 °F) 36.6 °C (97.8 °F) 36.7 °C (98 °F) 36.4 °C (97.5 °F) 36.5 °C (97.7 °F) 36.7 °C (98 °F)    Height (in)  1.727 m (5' 8\") 1.715 m (5' 7.5\")    1.715 m (5' 7.5\")   Weight (lb) 207 218 211.05 220.8 221.2 227.2 227   BMI 31.47 kg/m2 33.15 kg/m2 32.57 kg/m2 34.07 kg/m2 34.13 kg/m2 35.06 kg/m2 35.03 kg/m2   BSA (m2) 2.12 m2 " 2.18 m2 2.13 m2 2.18 m2 2.18 m2 2.21 m2 2.21 m2   Visit Report    Report Report Report Report      Body mass index is 35.03 kg/m².    GENERAL: A/Ox3, NAD. Appears healthy, well nourished  PSYCHIATRIC: Mood stable, appropriate memory recall  EYES: EOM intact, no scleral icterus  CARDIOVASCULAR: Palpable peripheral pulses  LUNGS: Breathing non-labored on room air  SKIN: no erythema, rashes, or ecchymoses     MUSCULOSKELETAL:  Laterality: right Knee Exam  - Skin intact  - No erythema or warmth  - No ecchymosis or soft tissue swelling  - Alignment: varus  - Palpation: Positive tenderness medial and lateral joint lines  - ROM: 0 - 5 - 120  - Effusion: Small  - Strength: knee extension and flexion 5/5, EHL/PF/DF motor intact  - Stability:        Anterior drawer stable       Posterior drawer stable       Varus/valgus stable       negative Lachman  -Equivocal Ailyn's  - Gait: Antalgic  - Hip Exam: flexion to 100+ degrees, full extension, internal/external rotation adequate, and no pain with log roll    NEUROVASCULAR:  - Neurovascular Status: sensation intact to light touch distally, lower extremity motor intact  - Capillary refill brisk at extremities, Bilateral dorsalis pedis pulse 2+    Imaging: Multiple views of the affected right knee(s) demonstrate: Avascular necrosis of the femur and tibia, degenerative changes tricompartmentally.   X-rays were personally reviewed and interpreted by me.  Radiology reports were reviewed by me as well, if readily available at the time.    L Inj/Asp: R knee on 3/4/2024 1:30 PM  Indications: pain  Details: 25 G needle, anterolateral approach  Medications: 40 mg triamcinolone acetonide 40 mg/mL; 2 mL lidocaine 10 mg/mL (1 %)  Outcome: tolerated well, no immediate complications  Procedure, treatment alternatives, risks and benefits explained, specific risks discussed. Consent was given by the patient. Immediately prior to procedure a time out was called to verify the correct patient,  procedure, equipment, support staff and site/side marked as required. Patient was prepped and draped in the usual sterile fashion.               Panda Hughes MD  Attending Surgeon    Sports Medicine Orthopaedic Surgery  [facility] [employer]

## 2024-03-06 RX ORDER — TRIAMCINOLONE ACETONIDE 40 MG/ML
40 INJECTION, SUSPENSION INTRA-ARTICULAR; INTRAMUSCULAR
Status: COMPLETED | OUTPATIENT
Start: 2024-03-04 | End: 2024-03-04

## 2024-03-06 RX ORDER — LIDOCAINE HYDROCHLORIDE 10 MG/ML
2 INJECTION INFILTRATION; PERINEURAL
Status: COMPLETED | OUTPATIENT
Start: 2024-03-04 | End: 2024-03-04

## 2024-03-19 DIAGNOSIS — K21.9 GASTROESOPHAGEAL REFLUX DISEASE WITHOUT ESOPHAGITIS: ICD-10-CM

## 2024-03-19 RX ORDER — PANTOPRAZOLE SODIUM 40 MG/1
40 TABLET, DELAYED RELEASE ORAL DAILY
Qty: 90 TABLET | Refills: 1 | Status: SHIPPED | OUTPATIENT
Start: 2024-03-19

## 2024-05-04 ASSESSMENT — ENCOUNTER SYMPTOMS
VOMITING: 0
DIARRHEA: 0
SHORTNESS OF BREATH: 0
ANOREXIA: 0
FEVER: 0
EYE PAIN: 0
RHINORRHEA: 0
NAIL CHANGES: 0
COUGH: 0
FATIGUE: 0
SORE THROAT: 0

## 2024-05-06 ENCOUNTER — OFFICE VISIT (OUTPATIENT)
Dept: PRIMARY CARE | Facility: CLINIC | Age: 89
End: 2024-05-06
Payer: MEDICARE

## 2024-05-06 VITALS
TEMPERATURE: 97.4 F | HEART RATE: 79 BPM | DIASTOLIC BLOOD PRESSURE: 74 MMHG | BODY MASS INDEX: 35.12 KG/M2 | SYSTOLIC BLOOD PRESSURE: 149 MMHG | WEIGHT: 227.6 LBS | OXYGEN SATURATION: 94 %

## 2024-05-06 DIAGNOSIS — L30.9 DERMATITIS: Primary | ICD-10-CM

## 2024-05-06 PROCEDURE — 3077F SYST BP >= 140 MM HG: CPT | Performed by: NURSE PRACTITIONER

## 2024-05-06 PROCEDURE — 3078F DIAST BP <80 MM HG: CPT | Performed by: NURSE PRACTITIONER

## 2024-05-06 PROCEDURE — 1036F TOBACCO NON-USER: CPT | Performed by: NURSE PRACTITIONER

## 2024-05-06 PROCEDURE — 1160F RVW MEDS BY RX/DR IN RCRD: CPT | Performed by: NURSE PRACTITIONER

## 2024-05-06 PROCEDURE — 1159F MED LIST DOCD IN RCRD: CPT | Performed by: NURSE PRACTITIONER

## 2024-05-06 PROCEDURE — 99213 OFFICE O/P EST LOW 20 MIN: CPT | Performed by: NURSE PRACTITIONER

## 2024-05-06 PROCEDURE — 1157F ADVNC CARE PLAN IN RCRD: CPT | Performed by: NURSE PRACTITIONER

## 2024-05-06 RX ORDER — MUPIROCIN 20 MG/G
OINTMENT TOPICAL
Qty: 22 G | Refills: 0 | Status: SHIPPED | OUTPATIENT
Start: 2024-05-06 | End: 2024-05-13

## 2024-05-06 ASSESSMENT — ENCOUNTER SYMPTOMS
DEPRESSION: 0
NAIL CHANGES: 0
DIARRHEA: 0
VOMITING: 0
FATIGUE: 0
RHINORRHEA: 0
EYE PAIN: 0
LOSS OF SENSATION IN FEET: 0
SORE THROAT: 0
OCCASIONAL FEELINGS OF UNSTEADINESS: 1
FEVER: 0
COUGH: 0
SHORTNESS OF BREATH: 0
ANOREXIA: 0

## 2024-05-06 ASSESSMENT — PATIENT HEALTH QUESTIONNAIRE - PHQ9
1. LITTLE INTEREST OR PLEASURE IN DOING THINGS: NOT AT ALL
2. FEELING DOWN, DEPRESSED OR HOPELESS: NOT AT ALL
SUM OF ALL RESPONSES TO PHQ9 QUESTIONS 1 & 2: 0

## 2024-05-06 NOTE — PATIENT INSTRUCTIONS
Use the Mupirocin as ordered.  Let's try to get it evaluated early on to see what it looks like when it first starts before scabbing.

## 2024-05-06 NOTE — PROGRESS NOTES
"Subjective   Mic Saunders is a 89 y.o. male who presents for sores on hip (Dr. Arevalo called them cold sores.).    Rash  This is a chronic problem. The current episode started more than 1 year ago. The problem has been gradually worsening since onset. The affected locations include the back. The rash is characterized by burning and redness. He was exposed to nothing. Pertinent negatives include no anorexia, congestion, cough, diarrhea, eye pain, facial edema, fatigue, fever, joint pain, nail changes, rhinorrhea, shortness of breath, sore throat or vomiting.     He presents to the office today for evaluation of sores on the left hip which started about 2 weeks ago. Develops bumps which hurt but are not itchy.   He reports he has been told they are \"cold sores.\"  No drainage.  He is not sure if they look like blisters or pimples because he is unable to see them.   It always affects his left buttock.   These episodes are generally occurring about 2 times a year.  They will scab, peel and go away  No fever or chills.  Feeling well otherwise.       Took Cephalexin for 6 days he had at home and the area seems to be getting better.    Review of Systems   Constitutional:  Negative for fatigue and fever.   HENT:  Negative for congestion, rhinorrhea and sore throat.    Eyes:  Negative for pain.   Respiratory:  Negative for cough and shortness of breath.    Gastrointestinal:  Negative for anorexia, diarrhea and vomiting.   Musculoskeletal:  Negative for joint pain.   Skin:  Positive for rash. Negative for nail changes.       Objective   /74 (BP Location: Left arm, Patient Position: Sitting, BP Cuff Size: Adult)   Pulse 79   Temp 36.3 °C (97.4 °F) (Temporal)   Wt 103 kg (227 lb 9.6 oz)   SpO2 94%   BMI 35.12 kg/m²     Physical Exam  Constitutional:       General: He is not in acute distress.     Appearance: Normal appearance. He is not toxic-appearing.   Cardiovascular:      Rate and Rhythm: Normal rate and regular " rhythm.      Heart sounds: Normal heart sounds, S1 normal and S2 normal. No murmur heard.  Pulmonary:      Effort: Pulmonary effort is normal.      Breath sounds: Normal breath sounds and air entry.   Lymphadenopathy:      Cervical: No cervical adenopathy.   Skin:            Comments: Left buttock with a clustered area of scabs that are peeling.   No surrounding erythema.  No warmth upon palpation.   (+) slight TTP  No drainage noted.    Neurological:      Mental Status: He is alert and oriented to person, place, and time.   Psychiatric:         Mood and Affect: Mood normal.         Behavior: Behavior normal.         Thought Content: Thought content normal.         Judgment: Judgment normal.         Assessment/Plan   Problem List Items Addressed This Visit    None  Visit Diagnoses       Dermatitis    -  Primary    Relevant Medications    mupirocin (Bactroban) 2 % ointment        Advised to use Mupirocin until healed. Recommend evaluation at onset with next occurrence to determine how they look (to see if vesicular or pustular.)    It has been a pleasure seeing you today!

## 2024-06-03 ENCOUNTER — OFFICE VISIT (OUTPATIENT)
Dept: PRIMARY CARE | Facility: CLINIC | Age: 89
End: 2024-06-03
Payer: MEDICARE

## 2024-06-03 VITALS
HEART RATE: 80 BPM | SYSTOLIC BLOOD PRESSURE: 150 MMHG | BODY MASS INDEX: 35.03 KG/M2 | DIASTOLIC BLOOD PRESSURE: 72 MMHG | OXYGEN SATURATION: 94 % | WEIGHT: 227 LBS | TEMPERATURE: 98 F

## 2024-06-03 DIAGNOSIS — R35.0 URINARY FREQUENCY: ICD-10-CM

## 2024-06-03 DIAGNOSIS — R42 VERTIGO: Primary | ICD-10-CM

## 2024-06-03 LAB
POC APPEARANCE, URINE: CLEAR
POC BILIRUBIN, URINE: NEGATIVE
POC BLOOD, URINE: NEGATIVE
POC COLOR, URINE: YELLOW
POC GLUCOSE, URINE: NEGATIVE MG/DL
POC KETONES, URINE: NEGATIVE MG/DL
POC LEUKOCYTES, URINE: NEGATIVE
POC NITRITE,URINE: NEGATIVE
POC PH, URINE: 6 PH
POC PROTEIN, URINE: NEGATIVE MG/DL
POC SPECIFIC GRAVITY, URINE: 1.01
POC UROBILINOGEN, URINE: 0.2 EU/DL

## 2024-06-03 PROCEDURE — 81003 URINALYSIS AUTO W/O SCOPE: CPT | Performed by: NURSE PRACTITIONER

## 2024-06-03 PROCEDURE — 1160F RVW MEDS BY RX/DR IN RCRD: CPT | Performed by: NURSE PRACTITIONER

## 2024-06-03 PROCEDURE — 99213 OFFICE O/P EST LOW 20 MIN: CPT | Performed by: NURSE PRACTITIONER

## 2024-06-03 PROCEDURE — 1159F MED LIST DOCD IN RCRD: CPT | Performed by: NURSE PRACTITIONER

## 2024-06-03 PROCEDURE — 1036F TOBACCO NON-USER: CPT | Performed by: NURSE PRACTITIONER

## 2024-06-03 PROCEDURE — 3077F SYST BP >= 140 MM HG: CPT | Performed by: NURSE PRACTITIONER

## 2024-06-03 PROCEDURE — 1157F ADVNC CARE PLAN IN RCRD: CPT | Performed by: NURSE PRACTITIONER

## 2024-06-03 PROCEDURE — 3078F DIAST BP <80 MM HG: CPT | Performed by: NURSE PRACTITIONER

## 2024-06-03 ASSESSMENT — ENCOUNTER SYMPTOMS
CHILLS: 0
WEAKNESS: 1
SPEECH DIFFICULTY: 0
COUGH: 0
FEVER: 0
STRIDOR: 0
NUMBNESS: 0
FATIGUE: 1
DIZZINESS: 1
NAUSEA: 0
SHORTNESS OF BREATH: 1
LIGHT-HEADEDNESS: 0
HEADACHES: 0
PALPITATIONS: 0
VOMITING: 0

## 2024-06-03 ASSESSMENT — PATIENT HEALTH QUESTIONNAIRE - PHQ9
1. LITTLE INTEREST OR PLEASURE IN DOING THINGS: NOT AT ALL
2. FEELING DOWN, DEPRESSED OR HOPELESS: NOT AT ALL
SUM OF ALL RESPONSES TO PHQ9 QUESTIONS 1 AND 2: 0

## 2024-06-03 NOTE — PROGRESS NOTES
"Subjective   Mic Saunders is a 89 y.o. male who presents for Vertigo (Has been happening when he turns over in bed. It doesn't happen when he stands up. ).    HPI  He presents to the office today with his son for evaluation of vertigo.  Saturday morning was trying to get out of bed- rolled over to the right side- had Vertigo \"felt like mattress was spinning.\"  Laid back on his back and it went away.  Tried to roll to the right again and it reoccured.  Son came in to help him get up- pulled him up and he was ok.   Last night was laying on his back and couldn't fall asleep- started to roll to the right and symptoms came on again.  No associated nausea or vomiting.  No double vision or loss of vision.  No headaches.    (+) fall a couple weeks ago - slipped putting shoes on.  Hit head on carpeted floor from standing position.  No LOC.  No bump on head but did have small abrasion per son.   Felt fine afterwards.  Son reports he does not get up and go like he used to- less motivated and weaker.   Not getting up and doing things like he used to.  Getting exhausted much more easily.  No chest pain, or palpitations.   (+) chronic SOB (at baseline).  No fever or chills.  No ear pain, ringing or fullness.  No slurred speech.   Has had vertigo in the past.   In the early 2000 had first episode of vertigo lasted about a week.   2008- had it for a month  Does not recall how he got it to go away.    In mid 2000 had a complete prostatectomy (believes complete- but will check) due to prostate cancer.  Ended up in the hospital for longer than planned- not sure why.  Since then has had issues with urinating frequently (at least 20 times a day.)  (+) stress incontinence with coughing or sneezing.   Drinks water but no caffeine.   Reports he feels he is emptying all the way but has to go again quickly.  Has to strain with urination- stream will stop in the middle.    Review of Systems   Constitutional:  Positive for fatigue. Negative for " chills and fever.   HENT:  Negative for ear pain and tinnitus.    Respiratory:  Positive for shortness of breath. Negative for cough and stridor.    Cardiovascular:  Positive for leg swelling. Negative for chest pain and palpitations.   Gastrointestinal:  Negative for nausea and vomiting.   Neurological:  Positive for dizziness and weakness. Negative for syncope, speech difficulty, light-headedness, numbness and headaches.       Objective   /72 (BP Location: Left arm, Patient Position: Sitting)   Pulse 80   Temp 36.7 °C (98 °F) (Temporal)   Wt 103 kg (227 lb)   SpO2 94%   BMI 35.03 kg/m²     Physical Exam  Constitutional:       General: He is not in acute distress.     Appearance: Normal appearance. He is not toxic-appearing.   HENT:      Right Ear: Tympanic membrane, ear canal and external ear normal.      Left Ear: Tympanic membrane, ear canal and external ear normal.   Eyes:      Extraocular Movements: Extraocular movements intact.      Conjunctiva/sclera: Conjunctivae normal.      Pupils: Pupils are equal, round, and reactive to light.   Neck:      Vascular: No carotid bruit.   Cardiovascular:      Rate and Rhythm: Normal rate and regular rhythm.      Pulses: Normal pulses.      Heart sounds: Normal heart sounds, S1 normal and S2 normal. No murmur heard.  Pulmonary:      Effort: Pulmonary effort is normal. No respiratory distress.      Breath sounds: Normal breath sounds and air entry.   Abdominal:      General: Bowel sounds are normal.      Palpations: Abdomen is soft.      Tenderness: There is no abdominal tenderness.   Musculoskeletal:      Right lower leg: Edema present.      Left lower leg: Edema present.   Lymphadenopathy:      Cervical: No cervical adenopathy.   Neurological:      Mental Status: He is alert and oriented to person, place, and time.      Cranial Nerves: Cranial nerves 2-12 are intact.      Sensory: Sensation is intact.      Motor: Motor function is intact.      Coordination:  Coordination is intact.   Psychiatric:         Attention and Perception: Attention normal.         Mood and Affect: Mood and affect normal.         Behavior: Behavior normal. Behavior is cooperative.         Thought Content: Thought content normal.         Cognition and Memory: Cognition normal.         Judgment: Judgment normal.       Assessment/Plan   Problem List Items Addressed This Visit       Vertigo - Primary     Suspect positional vertigo.  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  Discuss CT head given recent fall with head trauma.  He reports he does not wish to have imaging at this time.   Discussed red flags which require urgent evaluation and include associated severe headache, vision changes, numbness, tingling, weakness, or speech difficulty.   If symptoms persistent he will let me know.         Urinary frequency     Chronic symptoms- will refer to urology.         Relevant Orders    Referral to Urology    POCT UA Automated manually resulted (Completed)       It has been a pleasure seeing you today!

## 2024-06-03 NOTE — PATIENT INSTRUCTIONS
Referral to urology for urinary incontinence and frequency.  Please let me know if vertigo persists.   Discussed red flags which require urgent evaluation and include associated severe headache, vision changes, numbness, tingling, weakness, or speech difficulty.

## 2024-06-04 NOTE — ASSESSMENT & PLAN NOTE
Suspect positional vertigo.  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  Discuss CT head given recent fall with head trauma.  He reports he does not wish to have imaging at this time.   Discussed red flags which require urgent evaluation and include associated severe headache, vision changes, numbness, tingling, weakness, or speech difficulty.   If symptoms persistent he will let me know.

## 2024-06-05 ENCOUNTER — OFFICE VISIT (OUTPATIENT)
Dept: ORTHOPEDIC SURGERY | Facility: CLINIC | Age: 89
End: 2024-06-05
Payer: MEDICARE

## 2024-06-05 VITALS — BODY MASS INDEX: 34.4 KG/M2 | HEIGHT: 68 IN | WEIGHT: 227 LBS

## 2024-06-05 DIAGNOSIS — M17.31 POST-TRAUMATIC OSTEOARTHRITIS OF RIGHT KNEE: Primary | ICD-10-CM

## 2024-06-05 PROCEDURE — 1157F ADVNC CARE PLAN IN RCRD: CPT | Performed by: STUDENT IN AN ORGANIZED HEALTH CARE EDUCATION/TRAINING PROGRAM

## 2024-06-05 PROCEDURE — 20610 DRAIN/INJ JOINT/BURSA W/O US: CPT | Performed by: STUDENT IN AN ORGANIZED HEALTH CARE EDUCATION/TRAINING PROGRAM

## 2024-06-05 PROCEDURE — 1160F RVW MEDS BY RX/DR IN RCRD: CPT | Performed by: STUDENT IN AN ORGANIZED HEALTH CARE EDUCATION/TRAINING PROGRAM

## 2024-06-05 PROCEDURE — 1159F MED LIST DOCD IN RCRD: CPT | Performed by: STUDENT IN AN ORGANIZED HEALTH CARE EDUCATION/TRAINING PROGRAM

## 2024-06-05 PROCEDURE — 1036F TOBACCO NON-USER: CPT | Performed by: STUDENT IN AN ORGANIZED HEALTH CARE EDUCATION/TRAINING PROGRAM

## 2024-06-05 PROCEDURE — 99214 OFFICE O/P EST MOD 30 MIN: CPT | Performed by: STUDENT IN AN ORGANIZED HEALTH CARE EDUCATION/TRAINING PROGRAM

## 2024-06-05 RX ORDER — TRIAMCINOLONE ACETONIDE 40 MG/ML
40 INJECTION, SUSPENSION INTRA-ARTICULAR; INTRAMUSCULAR
Status: COMPLETED | OUTPATIENT
Start: 2024-06-05 | End: 2024-06-05

## 2024-06-05 RX ORDER — LIDOCAINE HYDROCHLORIDE 10 MG/ML
2 INJECTION INFILTRATION; PERINEURAL
Status: COMPLETED | OUTPATIENT
Start: 2024-06-05 | End: 2024-06-05

## 2024-06-05 RX ADMIN — TRIAMCINOLONE ACETONIDE 40 MG: 40 INJECTION, SUSPENSION INTRA-ARTICULAR; INTRAMUSCULAR at 13:35

## 2024-06-05 RX ADMIN — LIDOCAINE HYDROCHLORIDE 2 ML: 10 INJECTION INFILTRATION; PERINEURAL at 13:35

## 2024-06-05 NOTE — PROGRESS NOTES
PRIMARY CARE PHYSICIAN: Linda Vila, APRN-CNP  REFERRING PROVIDER: No referring provider defined for this encounter.     CONSULT ORTHOPAEDIC: Knee Evaluation    ASSESSMENT & PLAN    Impression: 89 y.o. male with right knee pain secondary to avascular necrosis and early degenerative changes.    Plan:   I explained to the patient the nature of their diagnosis.  I reviewed their imaging studies with them.    Based on the history, physical exam and imaging studies above, the patient's presentation is consistent with the above diagnosis.  I had a long discussion with the patient regarding their presentation and the treatment options.  We discussed initial nonoperative versus operative management options as well as potential further diagnostic imaging.  I reviewed the patient's x-ray findings with him.  He does have findings consistent with avascular necrosis and early degenerative changes.  We discussed treatment options going forward including initial nonoperative management.  I provided him with a corticosteroid injection as described below which she tolerated well.  He will focus on low impact activities.  I offered him physical therapy versus home exercises with the patient notes he is not interested and will do things on his own at home.    Follow-Up: Patient will follow-up with me as needed    At the end of the visit, all questions were answered in full. The patient is in agreement with the plan and recommendations. They will call the office with any questions/concerns.    Note dictated with [employer] software. Completed without full typed error editing and sent to avoid delay.       SUBJECTIVE  CHIEF COMPLAINT:   Chief Complaint   Patient presents with    Right Knee - Pain        HPI: Mic Saunders is a 89 y.o. patient who presents today with right knee pain.  He is here today for follow-up of his right knee pain and degenerative changes.  He is requesting a repeat corticosteroid injection,  previous injection was 3/4/24.    Please see below for full history from prior visit:    Pain has been going on for 3 years.  He notes that the knee pain bothers him in all of his daily activities.  He has difficulty ambulating due to the pain.  He has pain at night.  He has pain with any sort of increased activity.  He states that he previously fell directly onto his knee a number of years ago and his knee pain has progressively worsened since.    They deny any constant or progressive numbness or tingling in their legs.     REVIEW OF SYSTEMS  Constitutional: See HPI for pain assessment, No significant weight loss, recent trauma  Cardiovascular: No chest pain, shortness of breath  Respiratory: No difficulty breathing, cough  Gastrointestinal: No nausea, vomiting, diarrhea, constipation  Musculoskeletal: Noted in HPI, positive for pain, restricted motion, stiffness  Integumentary: No rashes, easy bruising, redness   Neurological: no numbness or tingling in extremities, no gait disturbances   Psychiatric: No mood changes, memory changes, social issues  Heme/Lymph: no excessive swelling, easy bruising, excessive bleeding  ENT: No hearing changes  Eyes: No vision changes    Past Medical History:   Diagnosis Date    Acute bronchitis due to other specified organisms 03/19/2019    Acute bacterial bronchitis    Anemia     Benign paroxysmal vertigo, unspecified ear 08/02/2019    Benign paroxysmal positional vertigo    BPH (benign prostatic hyperplasia) 02/13/2018    Cataract     Chronic fatigue, unspecified 03/10/2016    Chronic fatigue    Chronic rhinitis 05/31/2018    Rhinitis    COPD (chronic obstructive pulmonary disease) (Multi)     Cramp and spasm 05/05/2016    Muscle cramps at night    Dermatitis, unspecified 07/21/2013    Dermatitis, eczematoid    Disease of thyroid gland     Disorder of thyroid, unspecified     Thyroid trouble    Effusion, unspecified ankle 09/25/2018    Ankle edema    Emphysema of lung (Multi)      Encounter for preprocedural laboratory examination     Blood tests prior to treatment or procedure    Erythema intertrigo 07/21/2013    Intertrigo    Generalized hyperhidrosis 07/21/2013    Excessive sweating    GERD (gastroesophageal reflux disease)     Heart disease     HL (hearing loss)     Hypertension     Impacted cerumen, bilateral 08/05/2019    Excessive cerumen in both ear canals    Left lower quadrant pain 10/17/2014    Abdominal pain, LLQ (left lower quadrant)    Lymphedema, not elsewhere classified 03/09/2017    Lymphedema of arm    Malignant neoplasm of prostate (Multi)     Prostate cancer    Meniere's disease, 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    Meniere's disease    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    Narcolepsy    Neuromuscular disorder (Multi)     Other complications following immunization, not elsewhere classified, initial encounter 10/25/2016    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    Other conditions influencing health status 12/22/2015    History of cough    Other postherpetic nervous system involvement 07/21/2013    Postherpetic neuralgia    Other specified soft tissue disorders 05/08/2019    Other disorders of soft tissue    Personal history of diseases of the skin and subcutaneous tissue 04/13/2017    History of actinic keratosis    Personal history of malignant melanoma of skin 10/25/2016    History of malignant melanoma    Personal history of malignant neoplasm of prostate     History of malignant neoplasm of prostate    Personal history of other diseases of the circulatory system     History of hypertension    Personal history of other diseases of the digestive system 03/10/2016    History of rectal bleeding    Personal history of other diseases of the respiratory system 08/04/2014    History of upper respiratory infection    Personal history of other diseases of the respiratory system 09/14/2015    History of sinusitis    Personal history of other diseases of the  respiratory system 2015    History of bronchitis    Personal history of other specified conditions 10/17/2018    History of dysarthria    Personal history of other specified conditions 2015    History of short term memory loss    Personal history of other specified conditions 2017    History of persistent cough    Personal history of other specified conditions 2017    History of chronic fatigue    Personal history of other specified conditions 2015    History of hemoptysis    Pressure ulcer of unspecified buttock, unspecified stage     Pressure sore on buttocks    Sciatica, left side 10/18/2016    Sciatica of left side    Tachypnea, not elsewhere classified 2016    Tachypnea    Viral wart, unspecified 2013    Warts    Visual impairment         Allergies   Allergen Reactions    Shellfish Derived Headache and Shortness of breath    Metoprolol Dizziness    [place] Other        Past Surgical History:   Procedure Laterality Date    EYE SURGERY      OTHER SURGICAL HISTORY  05/15/2014    Biopsy Nasal Cavity    OTHER SURGICAL HISTORY  03/10/2014    Skin Tag Removal    PROSTATE SURGERY  03/10/2014    Prostate Surgery    TONSILLECTOMY  05/15/2014    Tonsillectomy    VASECTOMY          Family History   Problem Relation Name Age of Onset    Hearing loss Mother      Other (rheumatic heart disease [Other]) Father Denny Saunders     Heart disease Father Denny Saunders         Social History     Socioeconomic History    Marital status:      Spouse name: Not on file    Number of children: Not on file    Years of education: Not on file    Highest education level: Not on file   Occupational History    Not on file   Tobacco Use    Smoking status: Former     Current packs/day: 0.00     Average packs/day: 1.5 packs/day for 29.0 years (43.5 ttl pk-yrs)     Types: Cigarettes     Start date:      Quit date:      Years since quittin.4     Passive exposure: Past    Smokeless tobacco: Never    Vaping Use    Vaping status: Never Used   Substance and Sexual Activity    Alcohol use: Yes     Alcohol/week: 1.0 standard drink of alcohol     Types: 1 Standard drinks or equivalent per week     Comment: rarely    Drug use: Never    Sexual activity: Not Currently     Partners: Female   Other Topics Concern    Not on file   Social History Narrative    Not on file     Social Determinants of Health     Financial Resource Strain: Not on file   Food Insecurity: Not on file   Transportation Needs: Not on file   Physical Activity: Not on file   Stress: Not on file   Social Connections: Not on file   Intimate Partner Violence: Not on file   Housing Stability: Not on file        CURRENT MEDICATIONS:   Current Outpatient Medications   Medication Sig Dispense Refill    aspirin 81 mg EC tablet Take 1 tablet (81 mg) by mouth once daily. 90 tablet 1    atorvastatin (Lipitor) 20 mg tablet Take 1 tablet (20 mg) by mouth once daily. (Patient not taking: Reported on 6/3/2024) 90 tablet 1    azelastine (Astelin) 137 mcg (0.1 %) nasal spray SPRAY ONE TIME IN EACH NOSTRIL TWICE DAILY      clopidogrel (Plavix) 75 mg tablet TAKE 1 TABLET BY MOUTH EVERY DAY 90 tablet 1    levothyroxine (Synthroid, Levoxyl) 100 mcg tablet Take one tablet (100 mcg) by mouth every other day. 45 tablet 1    losartan (Cozaar) 100 mg tablet TAKE 1 TABLET BY MOUTH EVERY DAY 90 tablet 1    pantoprazole (ProtoNix) 40 mg EC tablet Take 1 tablet (40 mg) by mouth once daily. 90 tablet 1    Trelegy Ellipta 100-62.5-25 mcg blister with device Inhale 1 puff once daily.       No current facility-administered medications for this visit.        OBJECTIVE    PHYSICAL EXAM      7/26/2023    10:05 AM 10/18/2023     2:20 PM 1/8/2024     1:09 PM 3/4/2024     1:09 PM 5/6/2024    11:44 AM 6/3/2024    10:33 AM 6/5/2024    12:16 PM   Vitals   Systolic 136 156 136  149 150    Diastolic 76 58 60  74 72    Heart Rate 75 84 74  79 80    Temp 36.4 °C (97.5 °F) 36.5 °C (97.7 °F) 36.7  "°C (98 °F)  36.3 °C (97.4 °F) 36.7 °C (98 °F)    Height (in)    1.715 m (5' 7.5\")   1.717 m (5' 7.58\")   Weight (lb) 220.8 221.2 227.2 227 227.6 227 227   BMI 34.07 kg/m2 34.13 kg/m2 35.06 kg/m2 35.03 kg/m2 35.12 kg/m2 35.03 kg/m2 34.95 kg/m2   BSA (m2) 2.18 m2 2.18 m2 2.21 m2 2.21 m2 2.21 m2 2.21 m2 2.22 m2   Visit Report Report Report Report Report Report Report Report      Body mass index is 34.95 kg/m².    GENERAL: A/Ox3, NAD. Appears healthy, well nourished  PSYCHIATRIC: Mood stable, appropriate memory recall  EYES: EOM intact, no scleral icterus  CARDIOVASCULAR: Palpable peripheral pulses  LUNGS: Breathing non-labored on room air  SKIN: no erythema, rashes, or ecchymoses     MUSCULOSKELETAL:  Laterality: right Knee Exam  - Skin intact  - No erythema or warmth  - No ecchymosis or soft tissue swelling  - Alignment: varus  - Palpation: Positive tenderness medial and lateral joint lines  - ROM: 0 - 5 - 120  - Effusion: Small  - Strength: knee extension and flexion 5/5, EHL/PF/DF motor intact  - Stability:        Anterior drawer stable       Posterior drawer stable       Varus/valgus stable       negative Lachman  -Equivocal Ailyn's  - Gait: Antalgic  - Hip Exam: flexion to 100+ degrees, full extension, internal/external rotation adequate, and no pain with log roll    NEUROVASCULAR:  - Neurovascular Status: sensation intact to light touch distally, lower extremity motor intact  - Capillary refill brisk at extremities, Bilateral dorsalis pedis pulse 2+    Imaging: Multiple views of the affected right knee(s) demonstrate: Avascular necrosis of the femur and tibia, degenerative changes tricompartmentally.   X-rays were personally reviewed and interpreted by me.  Radiology reports were reviewed by me as well, if readily available at the time.    L Inj/Asp: R knee on 6/5/2024 1:35 PM  Indications: pain  Details: 25 G needle, anterolateral approach  Medications: 40 mg triamcinolone acetonide 40 mg/mL; 2 mL lidocaine " 10 mg/mL (1 %)  Outcome: tolerated well, no immediate complications  Procedure, treatment alternatives, risks and benefits explained, specific risks discussed. Consent was given by the patient. Immediately prior to procedure a time out was called to verify the correct patient, procedure, equipment, support staff and site/side marked as required. Patient was prepped and draped in the usual sterile fashion.               Panda Hughes MD  Attending Surgeon    Sports Medicine Orthopaedic Surgery  [facility]

## 2024-06-06 DIAGNOSIS — R41.3 MEMORY LOSS: ICD-10-CM

## 2024-06-06 DIAGNOSIS — R42 VERTIGO: Primary | ICD-10-CM

## 2024-06-10 ENCOUNTER — HOSPITAL ENCOUNTER (OUTPATIENT)
Dept: RADIOLOGY | Facility: CLINIC | Age: 89
Discharge: HOME | End: 2024-06-10
Payer: MEDICARE

## 2024-06-10 DIAGNOSIS — R42 VERTIGO: ICD-10-CM

## 2024-06-10 DIAGNOSIS — R41.3 MEMORY LOSS: ICD-10-CM

## 2024-06-10 PROCEDURE — 70450 CT HEAD/BRAIN W/O DYE: CPT | Performed by: RADIOLOGY

## 2024-06-10 PROCEDURE — 70450 CT HEAD/BRAIN W/O DYE: CPT

## 2024-06-12 DIAGNOSIS — E87.1 HYPONATREMIA: Primary | ICD-10-CM

## 2024-06-18 ENCOUNTER — TELEPHONE (OUTPATIENT)
Dept: PRIMARY CARE | Facility: CLINIC | Age: 89
End: 2024-06-18
Payer: MEDICARE

## 2024-06-18 DIAGNOSIS — R53.82 CHRONIC FATIGUE: Primary | ICD-10-CM

## 2024-06-18 NOTE — TELEPHONE ENCOUNTER
Pt's Son Denny is calling in and asking if someone can take a look at his Father's ear. He was in the ER last week and one of the doctors were too aggressive with the Otoscope and it was the ear that was bothering him with his vertigo. His dad reports that ear has been hurting him now.

## 2024-06-19 ENCOUNTER — OFFICE VISIT (OUTPATIENT)
Dept: PRIMARY CARE | Facility: CLINIC | Age: 89
End: 2024-06-19
Payer: MEDICARE

## 2024-06-19 ENCOUNTER — LAB (OUTPATIENT)
Dept: LAB | Facility: LAB | Age: 89
End: 2024-06-19
Payer: MEDICARE

## 2024-06-19 VITALS
HEART RATE: 83 BPM | DIASTOLIC BLOOD PRESSURE: 60 MMHG | BODY MASS INDEX: 35.59 KG/M2 | WEIGHT: 231.2 LBS | SYSTOLIC BLOOD PRESSURE: 140 MMHG | OXYGEN SATURATION: 96 % | TEMPERATURE: 97.9 F

## 2024-06-19 DIAGNOSIS — E87.1 HYPONATREMIA: ICD-10-CM

## 2024-06-19 DIAGNOSIS — R40.0 SLEEPINESS: ICD-10-CM

## 2024-06-19 DIAGNOSIS — R42 VERTIGO: ICD-10-CM

## 2024-06-19 DIAGNOSIS — D64.9 ANEMIA, UNSPECIFIED TYPE: ICD-10-CM

## 2024-06-19 DIAGNOSIS — H60.551 ACUTE REACTIVE OTITIS EXTERNA OF RIGHT EAR: Primary | ICD-10-CM

## 2024-06-19 DIAGNOSIS — R53.82 CHRONIC FATIGUE: ICD-10-CM

## 2024-06-19 LAB
ANION GAP SERPL CALC-SCNC: 13 MMOL/L (ref 10–20)
BASOPHILS # BLD AUTO: 0.05 X10*3/UL (ref 0–0.1)
BASOPHILS NFR BLD AUTO: 0.5 %
BUN SERPL-MCNC: 21 MG/DL (ref 6–23)
CALCIUM SERPL-MCNC: 9 MG/DL (ref 8.6–10.3)
CHLORIDE SERPL-SCNC: 98 MMOL/L (ref 98–107)
CO2 SERPL-SCNC: 22 MMOL/L (ref 21–32)
CREAT SERPL-MCNC: 1.05 MG/DL (ref 0.5–1.3)
EGFRCR SERPLBLD CKD-EPI 2021: 68 ML/MIN/1.73M*2
EOSINOPHIL # BLD AUTO: 0.09 X10*3/UL (ref 0–0.4)
EOSINOPHIL NFR BLD AUTO: 0.9 %
ERYTHROCYTE [DISTWIDTH] IN BLOOD BY AUTOMATED COUNT: 20 % (ref 11.5–14.5)
GLUCOSE SERPL-MCNC: 134 MG/DL (ref 74–99)
HCT VFR BLD AUTO: 34.7 % (ref 41–52)
HGB BLD-MCNC: 10.7 G/DL (ref 13.5–17.5)
IMM GRANULOCYTES # BLD AUTO: 0.13 X10*3/UL (ref 0–0.5)
IMM GRANULOCYTES NFR BLD AUTO: 1.3 % (ref 0–0.9)
LYMPHOCYTES # BLD AUTO: 2.07 X10*3/UL (ref 0.8–3)
LYMPHOCYTES NFR BLD AUTO: 20.7 %
MCH RBC QN AUTO: 22.4 PG (ref 26–34)
MCHC RBC AUTO-ENTMCNC: 30.8 G/DL (ref 32–36)
MCV RBC AUTO: 73 FL (ref 80–100)
MONOCYTES # BLD AUTO: 1.05 X10*3/UL (ref 0.05–0.8)
MONOCYTES NFR BLD AUTO: 10.5 %
NEUTROPHILS # BLD AUTO: 6.61 X10*3/UL (ref 1.6–5.5)
NEUTROPHILS NFR BLD AUTO: 66.1 %
NRBC BLD-RTO: 0 /100 WBCS (ref 0–0)
PLATELET # BLD AUTO: 305 X10*3/UL (ref 150–450)
POTASSIUM SERPL-SCNC: 4.6 MMOL/L (ref 3.5–5.3)
RBC # BLD AUTO: 4.77 X10*6/UL (ref 4.5–5.9)
SODIUM SERPL-SCNC: 128 MMOL/L (ref 136–145)
WBC # BLD AUTO: 10 X10*3/UL (ref 4.4–11.3)

## 2024-06-19 PROCEDURE — 83540 ASSAY OF IRON: CPT

## 2024-06-19 PROCEDURE — 3078F DIAST BP <80 MM HG: CPT | Performed by: NURSE PRACTITIONER

## 2024-06-19 PROCEDURE — 82728 ASSAY OF FERRITIN: CPT

## 2024-06-19 PROCEDURE — 1157F ADVNC CARE PLAN IN RCRD: CPT | Performed by: NURSE PRACTITIONER

## 2024-06-19 PROCEDURE — 1036F TOBACCO NON-USER: CPT | Performed by: NURSE PRACTITIONER

## 2024-06-19 PROCEDURE — 1159F MED LIST DOCD IN RCRD: CPT | Performed by: NURSE PRACTITIONER

## 2024-06-19 PROCEDURE — 99214 OFFICE O/P EST MOD 30 MIN: CPT | Performed by: NURSE PRACTITIONER

## 2024-06-19 PROCEDURE — 36415 COLL VENOUS BLD VENIPUNCTURE: CPT

## 2024-06-19 PROCEDURE — 3077F SYST BP >= 140 MM HG: CPT | Performed by: NURSE PRACTITIONER

## 2024-06-19 PROCEDURE — 83550 IRON BINDING TEST: CPT

## 2024-06-19 PROCEDURE — 85025 COMPLETE CBC W/AUTO DIFF WBC: CPT

## 2024-06-19 PROCEDURE — 80048 BASIC METABOLIC PNL TOTAL CA: CPT

## 2024-06-19 RX ORDER — NEOMYCIN SULFATE, POLYMYXIN B SULFATE, HYDROCORTISONE 3.5; 10000; 1 MG/ML; [USP'U]/ML; MG/ML
4 SOLUTION/ DROPS AURICULAR (OTIC) 3 TIMES DAILY
Qty: 10 ML | Refills: 0 | Status: SHIPPED | OUTPATIENT
Start: 2024-06-19 | End: 2024-06-26

## 2024-06-19 ASSESSMENT — ENCOUNTER SYMPTOMS
FEVER: 0
CHEST TIGHTNESS: 0
COUGH: 0
CHILLS: 0
PALPITATIONS: 0
WHEEZING: 0
FATIGUE: 1
SHORTNESS OF BREATH: 1

## 2024-06-19 NOTE — PROGRESS NOTES
"Subjective   Mic Saunders is a 89 y.o. male who presents for Earache (Right ear pain- pt's son reported that it started hurting when the ER Dr \"aggressively\" put Otoscope in his ear).    Earache   There is pain in the right ear. This is a new problem. The current episode started 1 to 4 weeks ago. The problem occurs constantly. The problem has been rapidly worsening. There has been no fever. The pain is at a severity of 10/10. Associated symptoms include hearing loss. Pertinent negatives include no coughing or ear discharge.     He presents to the office today for an ER follow up and right ear pain.    He reports he has been having a hard time fitting his hearing aids just right. This has been bothering him. Then he went to ER and when they looked into his ears it created more pain.  (+) sharp pain that comes and goes. Painful to touch  Cannot wear his hearing aid  No drainage noted.   He has an appointment at hospitals to get hearing aid checked tomorrow.  No fever or chills.     After last visit his son reports he was really sleepy.  Falling asleep frequently throughout the day.  More confused.  He was sleeping in his chair without his CPAP to avoid the vertigo.  Given the drastic change, he was sent to the ER for evaluation.  While there he underwent blood work and a CTA of chest.  (+) congestion in lungs prior to ER vision but this is better.   He has been wearing his CPAP regularly since Friday and he is more alert and awake and less sleepy during the day per his son.   He seems to be at baseline today.   He reports he still has lightheadedness at times but has not had any further vertigo.  He got a new bed that allows his to reposition which has helped.    Reviewed ER records    Review of Systems   Constitutional:  Positive for fatigue. Negative for chills and fever.   HENT:  Positive for ear pain and hearing loss. Negative for ear discharge.    Respiratory:  Positive for shortness of breath. Negative for " cough, chest tightness and wheezing.    Cardiovascular:  Positive for leg swelling. Negative for chest pain and palpitations.       Objective   /60 (BP Location: Left arm, Patient Position: Sitting)   Pulse 83   Temp 36.6 °C (97.9 °F) (Temporal)   Wt 105 kg (231 lb 3.2 oz)   SpO2 96%   BMI 35.59 kg/m²     Physical Exam  Constitutional:       General: He is not in acute distress.     Appearance: Normal appearance. He is not toxic-appearing.   HENT:      Right Ear: Tympanic membrane normal. Decreased hearing noted.      Left Ear: Tympanic membrane normal. Decreased hearing noted.      Ears:      Comments: (+) tender on palpation to right tragus.   Right ear canal with erythema and edema  Bilateral TM normal.     Eyes:      Extraocular Movements: Extraocular movements intact.      Conjunctiva/sclera: Conjunctivae normal.      Pupils: Pupils are equal, round, and reactive to light.   Cardiovascular:      Rate and Rhythm: Normal rate and regular rhythm.      Pulses: Normal pulses.      Heart sounds: Normal heart sounds, S1 normal and S2 normal. No murmur heard.  Pulmonary:      Effort: Pulmonary effort is normal. No respiratory distress.      Breath sounds: Normal breath sounds.   Abdominal:      General: Bowel sounds are normal.      Palpations: Abdomen is soft.      Tenderness: There is no abdominal tenderness.   Musculoskeletal:      Right lower leg: No edema.      Left lower leg: No edema.   Lymphadenopathy:      Cervical: No cervical adenopathy.   Neurological:      Mental Status: He is alert and oriented to person, place, and time.   Psychiatric:         Attention and Perception: Attention normal.         Mood and Affect: Mood and affect normal.         Behavior: Behavior normal. Behavior is cooperative.         Thought Content: Thought content normal.         Cognition and Memory: Cognition normal.         Judgment: Judgment normal.         Assessment/Plan   Problem List Items Addressed This Visit        Hyponatremia     Check BMP as ordered.         Vertigo     Positional and better so far- only if lays flat.         Acute reactive otitis externa of right ear - Primary    Relevant Medications    neomycin-polymyxin-HC (Cortisporin) otic solution    Sleepiness     Improving with regular use of CPAP.  Continue to monitor.            It has been a pleasure seeing you today!

## 2024-06-20 DIAGNOSIS — I10 BENIGN ESSENTIAL HTN: ICD-10-CM

## 2024-06-20 RX ORDER — LOSARTAN POTASSIUM 100 MG/1
100 TABLET ORAL DAILY
Qty: 90 TABLET | Refills: 1 | Status: SHIPPED | OUTPATIENT
Start: 2024-06-20

## 2024-06-20 NOTE — PROGRESS NOTES
"Subjective   Mic Saunders is a 89 y.o. male who presents for Earache (Right ear pain- pt's son reported that it started hurting when the ER Dr \"aggressively\" put Otoscope in his ear).    Earache   There is pain in the right ear. This is a new problem. The current episode started 1 to 4 weeks ago. The problem occurs constantly. The problem has been rapidly worsening. There has been no fever. The pain is at a severity of 10/10. Associated symptoms include hearing loss. Pertinent negatives include no coughing or ear discharge.     He presents to the office today along with his son for an ER follow up and right ear pain.    He reports he has been having a hard time fitting his hearing aids just right. This has been bothering him. Then he went to ER and when they looked into his ears it created more pain.  (+) sharp pain that comes and goes. Painful to touch  Cannot wear his hearing aid  No drainage noted.   He has an appointment at [facility] to get hearing aid checked tomorrow.  No fever or chills.     After last visit his son reports he was really sleepy.  Falling asleep frequently throughout the day.  More confused.  He was sleeping in his chair without his CPAP to avoid the vertigo.  Given the drastic change, he was sent to the ER for evaluation.  While there he underwent blood work and a CTA of chest.  (+) congestion in lungs prior to ER vision but this is better.   He has been wearing his CPAP regularly since Friday and he is more alert and awake and less sleepy during the day per his son.   He seems to be at baseline today.   He reports he still has lightheadedness at times but has not had any further vertigo.  He got a new bed that allows his to reposition which has helped.    Reviewed ER records    Review of Systems   Constitutional:  Positive for fatigue. Negative for chills and fever.   HENT:  Positive for ear pain and hearing loss. Negative for ear discharge.    Respiratory:  Positive for shortness of breath. " Negative for cough, chest tightness and wheezing.    Cardiovascular:  Positive for leg swelling. Negative for chest pain and palpitations.       Objective   /60 (BP Location: Left arm, Patient Position: Sitting)   Pulse 83   Temp 36.6 °C (97.9 °F) (Temporal)   Wt 105 kg (231 lb 3.2 oz)   SpO2 96%   BMI 35.59 kg/m²     Physical Exam  Constitutional:       General: He is not in acute distress.     Appearance: Normal appearance. He is not toxic-appearing.   HENT:      Right Ear: Tympanic membrane normal. Decreased hearing noted.      Left Ear: Tympanic membrane normal. Decreased hearing noted.      Ears:      Comments: (+) tender on palpation to right tragus.   Right ear canal with erythema and edema  Bilateral TM normal.     Cardiovascular:      Rate and Rhythm: Normal rate and regular rhythm.      Pulses: Normal pulses.      Heart sounds: Normal heart sounds, S1 normal and S2 normal. No murmur heard.  Pulmonary:      Effort: Pulmonary effort is normal. No respiratory distress.      Breath sounds: Normal breath sounds.   Abdominal:      General: Bowel sounds are normal.      Palpations: Abdomen is soft.      Tenderness: There is no abdominal tenderness.   Musculoskeletal:      Right lower leg: No edema.      Left lower leg: No edema.   Lymphadenopathy:      Head:      Right side of head: Preauricular adenopathy present. No posterior auricular adenopathy.      Left side of head: No preauricular adenopathy.      Cervical: No cervical adenopathy.   Neurological:      Mental Status: He is alert and oriented to person, place, and time.   Psychiatric:         Attention and Perception: Attention normal.         Mood and Affect: Mood and affect normal.         Behavior: Behavior normal. Behavior is cooperative.         Thought Content: Thought content normal.         Cognition and Memory: Cognition normal.         Judgment: Judgment normal.         Assessment/Plan   Problem List Items Addressed This Visit        Hyponatremia     Check BMP as ordered.         Vertigo     Positional and better so far- only if lays flat.         Acute reactive otitis externa of right ear - Primary    Relevant Medications    neomycin-polymyxin-HC (Cortisporin) otic solution    Sleepiness     Improving with regular use of CPAP.  Continue to monitor.            It has been a pleasure seeing you today!

## 2024-06-21 DIAGNOSIS — D64.9 ANEMIA, UNSPECIFIED TYPE: Primary | ICD-10-CM

## 2024-06-21 DIAGNOSIS — E87.1 HYPONATREMIA: ICD-10-CM

## 2024-06-21 LAB
FERRITIN SERPL-MCNC: 22 NG/ML (ref 20–300)
IRON SATN MFR SERPL: 4 % (ref 25–45)
IRON SERPL-MCNC: 16 UG/DL (ref 35–150)
TIBC SERPL-MCNC: 418 UG/DL (ref 240–445)
UIBC SERPL-MCNC: 402 UG/DL (ref 110–370)

## 2024-06-24 ENCOUNTER — LAB (OUTPATIENT)
Dept: LAB | Facility: LAB | Age: 89
End: 2024-06-24
Payer: MEDICARE

## 2024-06-24 DIAGNOSIS — E87.1 HYPONATREMIA: ICD-10-CM

## 2024-06-24 DIAGNOSIS — D64.9 ANEMIA, UNSPECIFIED TYPE: ICD-10-CM

## 2024-06-24 LAB
ANION GAP SERPL CALC-SCNC: 14 MMOL/L (ref 10–20)
BUN SERPL-MCNC: 15 MG/DL (ref 6–23)
CALCIUM SERPL-MCNC: 9.1 MG/DL (ref 8.6–10.3)
CHLORIDE SERPL-SCNC: 98 MMOL/L (ref 98–107)
CO2 SERPL-SCNC: 22 MMOL/L (ref 21–32)
CREAT SERPL-MCNC: 1.12 MG/DL (ref 0.5–1.3)
EGFRCR SERPLBLD CKD-EPI 2021: 63 ML/MIN/1.73M*2
ERYTHROCYTE [DISTWIDTH] IN BLOOD BY AUTOMATED COUNT: 20.5 % (ref 11.5–14.5)
GLUCOSE SERPL-MCNC: 107 MG/DL (ref 74–99)
HCT VFR BLD AUTO: 36.9 % (ref 41–52)
HGB BLD-MCNC: 11.1 G/DL (ref 13.5–17.5)
MCH RBC QN AUTO: 22.2 PG (ref 26–34)
MCHC RBC AUTO-ENTMCNC: 30.1 G/DL (ref 32–36)
MCV RBC AUTO: 74 FL (ref 80–100)
NRBC BLD-RTO: 0.2 /100 WBCS (ref 0–0)
PLATELET # BLD AUTO: 355 X10*3/UL (ref 150–450)
POTASSIUM SERPL-SCNC: 4.7 MMOL/L (ref 3.5–5.3)
RBC # BLD AUTO: 5 X10*6/UL (ref 4.5–5.9)
SODIUM SERPL-SCNC: 129 MMOL/L (ref 136–145)
WBC # BLD AUTO: 9.8 X10*3/UL (ref 4.4–11.3)

## 2024-06-24 PROCEDURE — 80048 BASIC METABOLIC PNL TOTAL CA: CPT

## 2024-06-24 PROCEDURE — 85027 COMPLETE CBC AUTOMATED: CPT

## 2024-06-24 PROCEDURE — 36415 COLL VENOUS BLD VENIPUNCTURE: CPT

## 2024-06-26 DIAGNOSIS — H60.551 ACUTE REACTIVE OTITIS EXTERNA OF RIGHT EAR: ICD-10-CM

## 2024-06-26 RX ORDER — NEOMYCIN SULFATE, POLYMYXIN B SULFATE, HYDROCORTISONE 3.5; 10000; 1 MG/ML; [USP'U]/ML; MG/ML
4 SOLUTION/ DROPS AURICULAR (OTIC) 3 TIMES DAILY
Qty: 10 ML | Refills: 0 | Status: CANCELLED | OUTPATIENT
Start: 2024-06-26 | End: 2024-07-03

## 2024-06-26 RX ORDER — CIPROFLOXACIN AND DEXAMETHASONE 3; 1 MG/ML; MG/ML
4 SUSPENSION/ DROPS AURICULAR (OTIC) 2 TIMES DAILY
Qty: 7.5 ML | Refills: 0 | Status: SHIPPED | OUTPATIENT
Start: 2024-06-26 | End: 2024-07-03

## 2024-06-26 NOTE — TELEPHONE ENCOUNTER
Been using them since Friday. He said his dad ear is still in pain. He said he would like you to send the ones in that were going to be $100 bucks.

## 2024-06-26 NOTE — TELEPHONE ENCOUNTER
Pt's Son (Denny) is calling in for med refill on    Neomycin-polymyxin otic    LOV: 06/19/24  NOV: 07/10/24    Pt's son is calling in saying pt ran out of ear medication. He is asking if you can send in the more expensive ear drops maybe it will have a better application on it so he can put them in easier.

## 2024-06-30 DIAGNOSIS — D64.9 ANEMIA, UNSPECIFIED TYPE: Primary | ICD-10-CM

## 2024-06-30 DIAGNOSIS — E87.1 HYPONATREMIA: ICD-10-CM

## 2024-07-03 ENCOUNTER — APPOINTMENT (OUTPATIENT)
Dept: UROLOGY | Facility: HOSPITAL | Age: 89
End: 2024-07-03
Payer: MEDICARE

## 2024-07-03 ASSESSMENT — ENCOUNTER SYMPTOMS
DIARRHEA: 0
ABDOMINAL PAIN: 0
NECK PAIN: 0
VOMITING: 0
SORE THROAT: 0
HEADACHES: 0
RHINORRHEA: 0
COUGH: 0

## 2024-07-05 ENCOUNTER — LAB (OUTPATIENT)
Dept: LAB | Facility: LAB | Age: 89
End: 2024-07-05
Payer: MEDICARE

## 2024-07-05 DIAGNOSIS — D64.9 ANEMIA, UNSPECIFIED TYPE: ICD-10-CM

## 2024-07-05 DIAGNOSIS — E87.1 HYPONATREMIA: ICD-10-CM

## 2024-07-05 LAB
ANION GAP SERPL CALC-SCNC: 13 MMOL/L (ref 10–20)
BUN SERPL-MCNC: 14 MG/DL (ref 6–23)
CALCIUM SERPL-MCNC: 9.5 MG/DL (ref 8.6–10.3)
CHLORIDE SERPL-SCNC: 99 MMOL/L (ref 98–107)
CO2 SERPL-SCNC: 23 MMOL/L (ref 21–32)
CREAT SERPL-MCNC: 1.1 MG/DL (ref 0.5–1.3)
EGFRCR SERPLBLD CKD-EPI 2021: 64 ML/MIN/1.73M*2
ERYTHROCYTE [DISTWIDTH] IN BLOOD BY AUTOMATED COUNT: 21.3 % (ref 11.5–14.5)
GLUCOSE SERPL-MCNC: 112 MG/DL (ref 74–99)
HCT VFR BLD AUTO: 37.9 % (ref 41–52)
HGB BLD-MCNC: 11.3 G/DL (ref 13.5–17.5)
MCH RBC QN AUTO: 22.1 PG (ref 26–34)
MCHC RBC AUTO-ENTMCNC: 29.8 G/DL (ref 32–36)
MCV RBC AUTO: 74 FL (ref 80–100)
NRBC BLD-RTO: 0 /100 WBCS (ref 0–0)
PLATELET # BLD AUTO: 317 X10*3/UL (ref 150–450)
POTASSIUM SERPL-SCNC: 4.8 MMOL/L (ref 3.5–5.3)
RBC # BLD AUTO: 5.11 X10*6/UL (ref 4.5–5.9)
SODIUM SERPL-SCNC: 130 MMOL/L (ref 136–145)
WBC # BLD AUTO: 8.3 X10*3/UL (ref 4.4–11.3)

## 2024-07-05 PROCEDURE — 36415 COLL VENOUS BLD VENIPUNCTURE: CPT

## 2024-07-05 PROCEDURE — 80048 BASIC METABOLIC PNL TOTAL CA: CPT

## 2024-07-05 PROCEDURE — 85027 COMPLETE CBC AUTOMATED: CPT

## 2024-07-10 ENCOUNTER — APPOINTMENT (OUTPATIENT)
Dept: PRIMARY CARE | Facility: CLINIC | Age: 89
End: 2024-07-10
Payer: MEDICARE

## 2024-07-10 VITALS
WEIGHT: 226 LBS | BODY MASS INDEX: 34.79 KG/M2 | SYSTOLIC BLOOD PRESSURE: 130 MMHG | TEMPERATURE: 98 F | HEART RATE: 79 BPM | DIASTOLIC BLOOD PRESSURE: 60 MMHG | OXYGEN SATURATION: 94 %

## 2024-07-10 DIAGNOSIS — Z86.69 H/O GUILLAIN-BARRE SYNDROME: ICD-10-CM

## 2024-07-10 DIAGNOSIS — I50.9 CONGESTIVE HEART FAILURE, UNSPECIFIED HF CHRONICITY, UNSPECIFIED HEART FAILURE TYPE (MULTI): ICD-10-CM

## 2024-07-10 DIAGNOSIS — J44.9 CHRONIC OBSTRUCTIVE PULMONARY DISEASE, UNSPECIFIED COPD TYPE (MULTI): ICD-10-CM

## 2024-07-10 DIAGNOSIS — K21.9 GASTROESOPHAGEAL REFLUX DISEASE WITHOUT ESOPHAGITIS: ICD-10-CM

## 2024-07-10 DIAGNOSIS — Z86.73 H/O TIA (TRANSIENT ISCHEMIC ATTACK) AND STROKE: ICD-10-CM

## 2024-07-10 DIAGNOSIS — R42 VERTIGO: ICD-10-CM

## 2024-07-10 DIAGNOSIS — D64.9 ANEMIA, UNSPECIFIED TYPE: Primary | ICD-10-CM

## 2024-07-10 DIAGNOSIS — E87.1 HYPONATREMIA: ICD-10-CM

## 2024-07-10 DIAGNOSIS — E03.9 HYPOTHYROIDISM (ACQUIRED): ICD-10-CM

## 2024-07-10 DIAGNOSIS — E78.5 HYPERLIPIDEMIA, UNSPECIFIED HYPERLIPIDEMIA TYPE: ICD-10-CM

## 2024-07-10 DIAGNOSIS — H60.551 ACUTE REACTIVE OTITIS EXTERNA OF RIGHT EAR: ICD-10-CM

## 2024-07-10 DIAGNOSIS — G47.33 OBSTRUCTIVE SLEEP APNEA: ICD-10-CM

## 2024-07-10 DIAGNOSIS — I10 BENIGN ESSENTIAL HTN: ICD-10-CM

## 2024-07-10 PROCEDURE — 3078F DIAST BP <80 MM HG: CPT | Performed by: NURSE PRACTITIONER

## 2024-07-10 PROCEDURE — 3075F SYST BP GE 130 - 139MM HG: CPT | Performed by: NURSE PRACTITIONER

## 2024-07-10 PROCEDURE — 1157F ADVNC CARE PLAN IN RCRD: CPT | Performed by: NURSE PRACTITIONER

## 2024-07-10 PROCEDURE — 1159F MED LIST DOCD IN RCRD: CPT | Performed by: NURSE PRACTITIONER

## 2024-07-10 PROCEDURE — 99214 OFFICE O/P EST MOD 30 MIN: CPT | Performed by: NURSE PRACTITIONER

## 2024-07-10 PROCEDURE — 1160F RVW MEDS BY RX/DR IN RCRD: CPT | Performed by: NURSE PRACTITIONER

## 2024-07-10 ASSESSMENT — ENCOUNTER SYMPTOMS
NUMBNESS: 0
SORE THROAT: 0
HEADACHES: 0
FEVER: 0
WHEEZING: 0
PALPITATIONS: 0
NECK PAIN: 0
COUGH: 0
FATIGUE: 1
ABDOMINAL PAIN: 0
CHILLS: 0
SHORTNESS OF BREATH: 1
RHINORRHEA: 0
VOMITING: 0
WEAKNESS: 0
DIARRHEA: 0

## 2024-07-10 NOTE — PROGRESS NOTES
Subjective    Mic Saunders is a 89 y.o. male who presents for 6 mon fu.  Earache   There is pain in the right ear. This is a recurrent problem. The current episode started more than 1 month ago. The problem occurs every few hours. The problem has been gradually improving. There has been no fever. The pain is at a severity of 8/10. Pertinent negatives include no abdominal pain, coughing, diarrhea, ear discharge, headaches, hearing loss, neck pain, rash, rhinorrhea, sore throat or vomiting.     He presents to the office today for a 6 month follow up. He reports overall he is startig to feel better. Still very tired- does not have stamina.   His ear pain has resolved. No further vertigo. He is now able to sleep in his new bed rather than a chair. He is using his CPAP every night.   Over the last couple of days he feels all of his symptoms are improving.   He is tolerating medications well at current dose- no side effects. No chest pain, shortness of breath, palpitations or edema. No headaches, numbness, tingling, weakness or vision changes.  No dizziness.  Diet: Not the best  Exercise: No exercise.   Weight: down 5 lbs since last visit but overall stable.    Last labs:   Lab Results   Component Value Date    WBC 8.3 07/05/2024    HGB 11.3 (L) 07/05/2024    HCT 37.9 (L) 07/05/2024    MCV 74 (L) 07/05/2024     07/05/2024      Lab Results   Component Value Date    GLUCOSE 112 (H) 07/05/2024    CALCIUM 9.5 07/05/2024     (L) 07/05/2024    K 4.8 07/05/2024    CO2 23 07/05/2024    CL 99 07/05/2024    BUN 14 07/05/2024    CREATININE 1.10 07/05/2024        Pulmonology appt with Dr. Esparza 8/7/2024  Recommend follow up with cardiology    Review of Systems   Constitutional:  Positive for fatigue. Negative for chills and fever.   HENT:  Positive for ear pain. Negative for ear discharge, hearing loss, rhinorrhea and sore throat.    Respiratory:  Positive for shortness of breath. Negative for cough and wheezing.     Cardiovascular:  Positive for leg swelling. Negative for chest pain and palpitations.   Gastrointestinal:  Negative for abdominal pain, diarrhea and vomiting.   Musculoskeletal:  Negative for neck pain.   Skin:  Negative for rash.   Neurological:  Negative for weakness, numbness and headaches.     Objective   /60 (BP Location: Left arm, Patient Position: Sitting)   Pulse 79   Temp 36.7 °C (98 °F) (Temporal)   Wt 103 kg (226 lb)   SpO2 94%   BMI 34.79 kg/m²     Physical Exam  Constitutional:       General: He is not in acute distress.     Appearance: Normal appearance. He is not toxic-appearing.   HENT:      Right Ear: Tympanic membrane, ear canal and external ear normal.      Left Ear: Tympanic membrane, ear canal and external ear normal.   Neck:      Thyroid: No thyroid mass or thyromegaly.   Cardiovascular:      Rate and Rhythm: Normal rate and regular rhythm.      Pulses: Normal pulses.      Heart sounds: Normal heart sounds, S1 normal and S2 normal. No murmur heard.  Pulmonary:      Effort: Pulmonary effort is normal. No accessory muscle usage or respiratory distress.      Breath sounds: Normal breath sounds and air entry.   Abdominal:      General: Bowel sounds are normal.      Palpations: Abdomen is soft.      Tenderness: There is no abdominal tenderness.   Musculoskeletal:      Right lower le+ Edema present.      Left lower le+ Edema present.   Lymphadenopathy:      Cervical: No cervical adenopathy.   Neurological:      Mental Status: He is alert and oriented to person, place, and time.   Psychiatric:         Mood and Affect: Mood normal.         Behavior: Behavior normal.         Thought Content: Thought content normal.         Judgment: Judgment normal.         Assessment/Plan   Problem List Items Addressed This Visit       Anemia - Primary    Benign essential HTN    CHF (congestive heart failure) (Multi)    GERD (gastroesophageal reflux disease)    H/O TIA (transient ischemic attack)  and stroke    Hypothyroidism (acquired)    Hyponatremia    Relevant Orders    Basic Metabolic Panel    Vertigo    Acute reactive otitis externa of right ear            It has been a pleasure seeing you today!

## 2024-07-10 NOTE — PATIENT INSTRUCTIONS
Take ferrous sulfate every other day as tolerated.  Recheck sodium level khushboo couple weeks.   Recommend follow up with pulmonology and cardiology.   Follow up in 6 months.

## 2024-07-11 ASSESSMENT — ENCOUNTER SYMPTOMS
VOMITING: 0
WEAKNESS: 0
COUGH: 0
DIARRHEA: 0
WHEEZING: 0
ABDOMINAL PAIN: 0
SHORTNESS OF BREATH: 1
NUMBNESS: 0
RHINORRHEA: 0
NECK PAIN: 0
CHILLS: 0
FATIGUE: 1
SORE THROAT: 0
PALPITATIONS: 0
FEVER: 0
HEADACHES: 0

## 2024-07-11 NOTE — ASSESSMENT & PLAN NOTE
Iron deficiency anemia.  Given age he does not want to pursue the underlying cause.  He verbalizes understanding that there could be bleeding in the GI tract from an ulcer or underlying cancer.  He does not want to do further testing.  Advised to take ferrous sulfate every other day for now.  Will continue to monitor.

## 2024-07-11 NOTE — PROGRESS NOTES
Subjective    Mic Saunders is a 89 y.o. male who presents for 6 mon fu.  Earache   There is pain in the right ear. This is a recurrent problem. The current episode started more than 1 month ago. The problem occurs every few hours. The problem has been gradually improving. There has been no fever. The pain is at a severity of 8/10. Pertinent negatives include no abdominal pain, coughing, diarrhea, ear discharge, headaches, hearing loss, neck pain, rash, rhinorrhea, sore throat or vomiting.     He presents to the office today for a 6 month follow up. He reports overall he is startig to feel better. Still very tired- does not have stamina.   His ear pain has resolved. No further vertigo. He is now able to sleep in his new bed rather than a chair. He is using his CPAP every night.   Over the last couple of days he feels all of his symptoms are improving.   He is tolerating medications well at current dose- no side effects. He continues to hold statin. No chest pain, shortness of breath, palpitations or edema. No headaches, numbness, tingling, weakness or vision changes.  No dizziness.  Diet: Not the best  Exercise: No exercise.   Weight: down 5 lbs since last visit but overall stable.    Last labs:   Lab Results   Component Value Date    WBC 8.3 07/05/2024    HGB 11.3 (L) 07/05/2024    HCT 37.9 (L) 07/05/2024    MCV 74 (L) 07/05/2024     07/05/2024      Lab Results   Component Value Date    GLUCOSE 112 (H) 07/05/2024    CALCIUM 9.5 07/05/2024     (L) 07/05/2024    K 4.8 07/05/2024    CO2 23 07/05/2024    CL 99 07/05/2024    BUN 14 07/05/2024    CREATININE 1.10 07/05/2024        Pulmonology appt with Dr. Esparza 8/7/2024  Recommend follow up with cardiology    Review of Systems   Constitutional:  Positive for fatigue. Negative for chills and fever.   HENT:  Positive for ear pain. Negative for ear discharge, hearing loss, rhinorrhea and sore throat.    Respiratory:  Positive for shortness of breath. Negative  for cough and wheezing.    Cardiovascular:  Positive for leg swelling. Negative for chest pain and palpitations.   Gastrointestinal:  Negative for abdominal pain, diarrhea and vomiting.   Musculoskeletal:  Negative for neck pain.   Skin:  Negative for rash.   Neurological:  Negative for weakness, numbness and headaches.     Objective   /60 (BP Location: Left arm, Patient Position: Sitting)   Pulse 79   Temp 36.7 °C (98 °F) (Temporal)   Wt 103 kg (226 lb)   SpO2 94%   BMI 34.79 kg/m²     Physical Exam  Constitutional:       General: He is not in acute distress.     Appearance: Normal appearance. He is not toxic-appearing.   HENT:      Right Ear: Tympanic membrane, ear canal and external ear normal.      Left Ear: Tympanic membrane, ear canal and external ear normal.   Neck:      Thyroid: No thyroid mass or thyromegaly.   Cardiovascular:      Rate and Rhythm: Normal rate and regular rhythm.      Pulses: Normal pulses.      Heart sounds: Normal heart sounds, S1 normal and S2 normal. No murmur heard.  Pulmonary:      Effort: Pulmonary effort is normal. No accessory muscle usage or respiratory distress.      Breath sounds: Normal breath sounds and air entry.   Abdominal:      General: Bowel sounds are normal.      Palpations: Abdomen is soft.      Tenderness: There is no abdominal tenderness.   Musculoskeletal:      Right lower le+ Edema present.      Left lower le+ Edema present.   Lymphadenopathy:      Cervical: No cervical adenopathy.   Neurological:      Mental Status: He is alert and oriented to person, place, and time.   Psychiatric:         Mood and Affect: Mood normal.         Behavior: Behavior normal.         Thought Content: Thought content normal.         Judgment: Judgment normal.         Assessment/Plan   Problem List Items Addressed This Visit       Anemia - Primary     Iron deficiency anemia.  Given age he does not want to pursue the underlying cause.  He verbalizes understanding that  there could be bleeding in the GI tract from an ulcer or underlying cancer.  He does not want to do further testing.  Advised to take ferrous sulfate every other day for now.  Will continue to monitor.         Benign essential HTN     Well-controlled on current medications.  Continue.         CHF (congestive heart failure) (Multi)     Clinically stable.  Recommend follow-up with cardiology and his recent fatigue and sleepiness.         GERD (gastroesophageal reflux disease)     Stable on the current dose of pantoprazole.  Continue.         H/O Guillain-[place] syndrome     Chronic numbness and tingling of lower extremities.         H/O TIA (transient ischemic attack) and stroke     Continue ASA and statin. Will revisit statin at follow up if feeling better.         Hyperlipidemia     Currently holding statin due to side effects.         Hypothyroidism (acquired)     On the current dose of levothyroxine.  Continue.         Obstructive sleep apnea     Follows with pulmonology.  Compliant with CPAP machine.         Chronic obstructive pulmonary disease (Multi)     He has not been using his inhaler.  Recommend he use his inhaler regularly.  He should follow-up with pulmonology as planned.         Hyponatremia     Upon further chart review this has been a chronic issue for many years.  He is back up to 130 which is much closer to baseline.  Will continue to monitor-plan to recheck in 2 weeks.  If remains stable will check at follow-up unless there is a change in patient's symptoms.         Relevant Orders    Basic Metabolic Panel    Vertigo     Resolved.         Acute reactive otitis externa of right ear     Resolved after the use of Ciprodex.            It has been a pleasure seeing you today!

## 2024-07-11 NOTE — ASSESSMENT & PLAN NOTE
He has not been using his inhaler.  Recommend he use his inhaler regularly.  He should follow-up with pulmonology as planned.

## 2024-07-11 NOTE — ASSESSMENT & PLAN NOTE
Upon further chart review this has been a chronic issue for many years.  He is back up to 130 which is much closer to baseline.  Will continue to monitor-plan to recheck in 2 weeks.  If remains stable will check at follow-up unless there is a change in patient's symptoms.

## 2024-07-22 DIAGNOSIS — E03.9 HYPOTHYROIDISM (ACQUIRED): ICD-10-CM

## 2024-07-22 RX ORDER — LEVOTHYROXINE SODIUM 100 UG/1
TABLET ORAL
Qty: 45 TABLET | Refills: 1 | Status: SHIPPED | OUTPATIENT
Start: 2024-07-22

## 2024-07-26 ASSESSMENT — ENCOUNTER SYMPTOMS
HEADACHES: 0
NECK PAIN: 0
SORE THROAT: 0
COUGH: 0
DIARRHEA: 0
VOMITING: 0
ABDOMINAL PAIN: 0
RHINORRHEA: 0

## 2024-08-02 ENCOUNTER — CLINICAL SUPPORT (OUTPATIENT)
Dept: AUDIOLOGY | Facility: CLINIC | Age: 89
End: 2024-08-02
Payer: MEDICARE

## 2024-08-02 ENCOUNTER — APPOINTMENT (OUTPATIENT)
Dept: OTOLARYNGOLOGY | Facility: CLINIC | Age: 89
End: 2024-08-02
Payer: MEDICARE

## 2024-08-02 VITALS — WEIGHT: 226 LBS | BODY MASS INDEX: 34.25 KG/M2 | HEIGHT: 68 IN

## 2024-08-02 DIAGNOSIS — H90.A22 SENSORINEURAL HEARING LOSS (SNHL) OF LEFT EAR WITH RESTRICTED HEARING OF RIGHT EAR: Primary | ICD-10-CM

## 2024-08-02 DIAGNOSIS — H61.23 BILATERAL IMPACTED CERUMEN: Primary | ICD-10-CM

## 2024-08-02 DIAGNOSIS — H90.A31 MIXED CONDUCTIVE AND SENSORINEURAL HEARING LOSS OF RIGHT EAR WITH RESTRICTED HEARING OF LEFT EAR: ICD-10-CM

## 2024-08-02 DIAGNOSIS — R42 VERTIGO: ICD-10-CM

## 2024-08-02 DIAGNOSIS — H93.293 AUDITORY DISCRIMINATION IMPAIRMENT, BILATERAL: ICD-10-CM

## 2024-08-02 DIAGNOSIS — H90.3 ASYMMETRIC SNHL (SENSORINEURAL HEARING LOSS): ICD-10-CM

## 2024-08-02 DIAGNOSIS — H69.92 EUSTACHIAN TUBE DYSFUNCTION, LEFT: ICD-10-CM

## 2024-08-02 PROBLEM — M25.519 SHOULDER PAIN: Status: ACTIVE | Noted: 2024-08-02

## 2024-08-02 PROBLEM — Z85.820 HISTORY OF MALIGNANT MELANOMA: Status: ACTIVE | Noted: 2024-08-02

## 2024-08-02 PROBLEM — G61.0 GUILLAIN-BARRE SYNDROME (MULTI): Status: ACTIVE | Noted: 2024-08-02

## 2024-08-02 PROBLEM — E07.9 DISORDER OF THYROID: Status: ACTIVE | Noted: 2024-08-02

## 2024-08-02 PROBLEM — R41.3 POOR SHORT-TERM MEMORY: Status: ACTIVE | Noted: 2024-08-02

## 2024-08-02 PROBLEM — B35.6 TINEA CRURIS: Status: ACTIVE | Noted: 2024-08-02

## 2024-08-02 PROBLEM — M79.89 LEG SWELLING: Status: ACTIVE | Noted: 2020-09-11

## 2024-08-02 PROBLEM — H81.09 MENIERE'S DISEASE: Status: ACTIVE | Noted: 2024-08-02

## 2024-08-02 PROBLEM — C61 MALIGNANT NEOPLASM OF PROSTATE (MULTI): Status: ACTIVE | Noted: 2024-08-02

## 2024-08-02 PROBLEM — B07.9 VERRUCA: Status: ACTIVE | Noted: 2024-08-02

## 2024-08-02 PROBLEM — L30.9 ECZEMA: Status: ACTIVE | Noted: 2024-08-02

## 2024-08-02 PROBLEM — H93.8X9 EAR MASS: Status: ACTIVE | Noted: 2024-08-02

## 2024-08-02 PROBLEM — M77.40 METATARSALGIA: Status: ACTIVE | Noted: 2024-08-02

## 2024-08-02 PROBLEM — R61 EXCESSIVE SWEATING: Status: ACTIVE | Noted: 2024-08-02

## 2024-08-02 PROCEDURE — 69210 REMOVE IMPACTED EAR WAX UNI: CPT | Performed by: STUDENT IN AN ORGANIZED HEALTH CARE EDUCATION/TRAINING PROGRAM

## 2024-08-02 PROCEDURE — 1036F TOBACCO NON-USER: CPT | Performed by: STUDENT IN AN ORGANIZED HEALTH CARE EDUCATION/TRAINING PROGRAM

## 2024-08-02 PROCEDURE — 1157F ADVNC CARE PLAN IN RCRD: CPT | Performed by: STUDENT IN AN ORGANIZED HEALTH CARE EDUCATION/TRAINING PROGRAM

## 2024-08-02 PROCEDURE — 99204 OFFICE O/P NEW MOD 45 MIN: CPT | Performed by: STUDENT IN AN ORGANIZED HEALTH CARE EDUCATION/TRAINING PROGRAM

## 2024-08-02 PROCEDURE — 92567 TYMPANOMETRY: CPT | Performed by: AUDIOLOGIST

## 2024-08-02 PROCEDURE — 92557 COMPREHENSIVE HEARING TEST: CPT | Performed by: AUDIOLOGIST

## 2024-08-02 PROCEDURE — 1159F MED LIST DOCD IN RCRD: CPT | Performed by: STUDENT IN AN ORGANIZED HEALTH CARE EDUCATION/TRAINING PROGRAM

## 2024-08-02 NOTE — PROGRESS NOTES
COMPREHENSIVE AUDIOMETRIC EVALUATION      Name:  Mic Saunders  :  1934  Age:  89 y.o.  Date of Evaluation:  24   Referring Provider:  Dr. Rojas     History:  Mr. Saunders was seen today as an add on for an evaluation of hearing.  Patient reported concerns for decreased hearing sensitivity.  Limited case history information obtained as patient was an add on.  Please see Dr. Rojas note for further case hx information.    See audiometric evaluation at end of this report or scanned under media tab    OTOSCOPY:       Right Ear: Clear canal       Left Ear: Clear canal    226 Hz TYMPANOMETRY:       Right Ear: Type A: normal peak pressure, compliance, and ear canal volume, consistent with middle ear function within normal limits       Left Ear: Type B: Flat with normal ear canal volume, consistent with middle ear involvement    AUDIOMETRIC EVALUATION (Phones):       Right Ear: Moderately severe sloping to Profound Mixed hearing loss                 Left Ear: Moderately severe sloping to Profound Sensorineural hearing loss           NOTE: Hearing sensitivity decreased as compared to most recent audiometric evaluation 2019    Test technique:  Standard Audiometry  Reliability:   good    SPEECH RECOGNITION THRESHOLD:       Right Ear:  Could not obtain SRT, SAT instead 70 dBHL in poor agreement with PTA       Left Ear:  70 dBHL in good agreement with PTA    WORD RECOGNITION:       Right Ear:   very poor  (24%) at elevated presentation level       Left Ear:   very poor  (4%) at elevated presentation level    DISCUSSION:   Discussed results and recommendations with patient.  Questions were addressed and the patient was encouraged to contact our department should concerns arise.    RECOMMENDATIONS:  -Recommend patient return should concerns for changes in hearing sensitivity arise or as medically indicated.    Olivia Meadows, CCC-A     Appt: 10:00 - 10:20 AM

## 2024-08-02 NOTE — PROGRESS NOTES
HPI  Mic Saunders is a 89 y.o. male presenting for evaluation of hearing loss, vertigo and right otitis.  The patient has a long history of hearing loss for which she uses hearing aids bilaterally.  Reports developing an episode of right otitis associated with discomfort using his hearing aid.  He was evaluated in the ED and underwent treatment with antibiotic eardrops with significant improvement of his ear discomfort.  In addition the patient endorses brief vertiginous starting 6 weeks ago.  Episodes of vertigo last less than a minute and are triggered by turning in supine position.  Reports his vertigo has improved but not completely resolved.  Denies current ear pain, discharge from ear, tinnitus, aural fullness or autophony.   Denies history of prior ear surgery other than ear tube placement.     Past Medical History:   Diagnosis Date    Acute bronchitis due to other specified organisms 03/19/2019    Acute bacterial bronchitis    Anemia     Benign paroxysmal vertigo, unspecified ear 08/02/2019    Benign paroxysmal positional vertigo    BPH (benign prostatic hyperplasia) 02/13/2018    Cataract     Chronic fatigue, unspecified 03/10/2016    Chronic fatigue    Chronic rhinitis 05/31/2018    Rhinitis    COPD (chronic obstructive pulmonary disease) (Multi)     Cramp and spasm 05/05/2016    Muscle cramps at night    Dermatitis, unspecified 07/21/2013    Dermatitis, eczematoid    Disease of thyroid gland     Disorder of thyroid, unspecified     Thyroid trouble    Dizziness     Effusion, unspecified ankle 09/25/2018    Ankle edema    Elevated PSA     Emphysema of lung (Multi)     Encounter for preprocedural laboratory examination     Blood tests prior to treatment or procedure    Erythema intertrigo 07/21/2013    Intertrigo    Generalized hyperhidrosis 07/21/2013    Excessive sweating    GERD (gastroesophageal reflux disease)     Heart disease     HL (hearing loss)     Hypertension     Impacted cerumen, bilateral  08/05/2019    Excessive cerumen in both ear canals    Left lower quadrant pain 10/17/2014    Abdominal pain, LLQ (left lower quadrant)    Lymphedema, not elsewhere classified 03/09/2017    Lymphedema of arm    Malignant neoplasm of prostate (Multi)     Prostate cancer    Meniere's disease, 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    Meniere's disease    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    Narcolepsy    Neuromuscular disorder (Multi)     Other complications following immunization, not elsewhere classified, initial encounter 10/25/2016    Guillain-[place] syndrome following vaccination    Other conditions influencing health status 12/22/2015    History of cough    Other postherpetic nervous system involvement 07/21/2013    Postherpetic neuralgia    Other specified soft tissue disorders 05/08/2019    Other disorders of soft tissue    Personal history of diseases of the skin and subcutaneous tissue 04/13/2017    History of actinic keratosis    Personal history of malignant melanoma of skin 10/25/2016    History of malignant melanoma    Personal history of malignant neoplasm of prostate     History of malignant neoplasm of prostate    Personal history of other diseases of the circulatory system     History of hypertension    Personal history of other diseases of the digestive system 03/10/2016    History of rectal bleeding    Personal history of other diseases of the respiratory system 08/04/2014    History of upper respiratory infection    Personal history of other diseases of the respiratory system 09/14/2015    History of sinusitis    Personal history of other diseases of the respiratory system 12/07/2015    History of bronchitis    Personal history of other specified conditions 10/17/2018    History of dysarthria    Personal history of other specified conditions 02/27/2015    History of short term memory loss    Personal history of other specified conditions 03/09/2017    History of persistent cough    Personal  history of other specified conditions 09/26/2017    History of chronic fatigue    Personal history of other specified conditions 12/22/2015    History of hemoptysis    Pressure ulcer of unspecified buttock, unspecified stage     Pressure sore on buttocks    Prostate cancer (Multi)     Sciatica, left side 10/18/2016    Sciatica of left side    Sleep apnea     Stroke (Multi)     Tachypnea, not elsewhere classified 05/05/2016    Tachypnea    Urinary incontinence     Viral wart, unspecified 07/21/2013    Warts    Visual impairment        Past Surgical History:   Procedure Laterality Date    EYE SURGERY      OTHER SURGICAL HISTORY  05/15/2014    Biopsy Nasal Cavity    OTHER SURGICAL HISTORY  03/10/2014    Skin Tag Removal    PROSTATE SURGERY  03/10/2014    Prostate Surgery    TONSILLECTOMY  05/15/2014    Tonsillectomy    VASECTOMY           Current Outpatient Medications on File Prior to Visit   Medication Sig Dispense Refill    aspirin 81 mg EC tablet Take 1 tablet (81 mg) by mouth once daily. 90 tablet 1    atorvastatin (Lipitor) 20 mg tablet Take 1 tablet (20 mg) by mouth once daily. 90 tablet 1    azelastine (Astelin) 137 mcg (0.1 %) nasal spray SPRAY ONE TIME IN EACH NOSTRIL TWICE DAILY      clopidogrel (Plavix) 75 mg tablet TAKE 1 TABLET BY MOUTH EVERY DAY 90 tablet 1    levothyroxine (Synthroid, Levoxyl) 100 mcg tablet Take one tablet (100 mcg) by mouth every other day. 45 tablet 1    losartan (Cozaar) 100 mg tablet Take 1 tablet (100 mg) by mouth once daily. 90 tablet 1    pantoprazole (ProtoNix) 40 mg EC tablet Take 1 tablet (40 mg) by mouth once daily. 90 tablet 1    Trelegy Ellipta 100-62.5-25 mcg blister with device Inhale 1 puff once daily.       No current facility-administered medications on file prior to visit.        Allergies   Allergen Reactions    Shellfish Derived Headache and Shortness of breath    Metoprolol Dizziness    Vista Other        Review of Systems  A detailed 12 point ROS was performed  and is negative except as noted in the intake form, HPI and/or Past Medical History        Physical Exam   CONSTITUTIONAL: Well developed, well nourished.  VOICE: Normal voice quality  RESPIRATION: Breathing comfortably, no stridor.  CV: No clubbing/cyanosis/edema in hands.  EYES: EOM Intact, sclera normal.  NEURO: Alert and oriented times 3, Cranial nerves V,VII intact and symmetric bilaterally.  HEAD AND FACE: Symmetric facial features, no masses or lesions, sinuses nontender to palpation.  SALIVARY GLANDS: Parotid and submandibular glands normal bilaterally.  + EARS: Normal external ears  Bilateral EACs with cerumen impaction (removed/see procedure note)  Right EAC patent, tympanic membrane intact  Left EAC patent, tympanic membrane intact, sclerotic   NOSE: External nose midline, anterior rhinoscopy is normal with limited visualization to the anterior aspect of the interior turbinates. No lesions noted.  ORAL CAVITY/OROPHARYNX/LIPS: Normal mucous membranes, normal floor of mouth/tongue/OP, no masses or lesions are noted.  PHARYNGEAL WALLS AND NASOPHARYNX: No masses noted. Mucosa appears clean and moist  NECK/LYMPH: No LAD, no thyroid masses. Trachea palpably midline  SKIN: Neck skin is without injury  PSYCH: Alert and oriented with appropriate mood and affect     Procedure: Removal of impacted cerumen, bilateral  Indication: Cerumen impaction.   The procedure was reviewed and explained.  Verbal consent was obtained.  With the use of the microscope and speculum the right ear was examined, cerumen was cleaned with the use of curettes and suction.  Attention was turned to the left ear, with the use of the microscope and speculum the left ear was examined, cerumen was cleaned with the use of curettes and suction. The patient tolerated the procedure well.       Results:   Audiogram 8/2/2024 reviewed:  Right moderate sloping to profound sensorineural hearing loss.  Speech discrimination score 24%.  Type a  tympanogram.  Left: Mild sloping to profound sensorineural hearing loss.  Speech discrimination score 4%.  Type B tympanogram.      Assessment  Vertigo  Asymmetric sensorineural hearing loss    Plan  89-year-old male presenting for evaluation of hearing loss, cerumen impaction and vertigo.  Bilateral cerumen impaction removed, preventive measures discussed.  No evidence of infection or middle ear effusion noted today on exam, he does have left tympanosclerosis limiting TM movement.  Audiogram performed today notable for bilateral sensorineural hearing loss with low-frequency bone line asymmetry and poor speech discrimination score in both ears.  Multiple diagnostic alternatives discussed, he will like to defer additional imaging/MRI.  He recently had a CT head which did not show intracranial abnormality or masses.   Treatment alternatives for SNHL discussed, he is not interested in surgical options, will like to continue hearing aid use.   Described brief vertiginous events occurring in supine position likely secondary to BPPV, he was referred to PT for canalith repositioning maneuvers.  RTC 6w

## 2024-08-14 DIAGNOSIS — Z86.73 H/O TIA (TRANSIENT ISCHEMIC ATTACK) AND STROKE: ICD-10-CM

## 2024-08-14 RX ORDER — CLOPIDOGREL BISULFATE 75 MG/1
75 TABLET ORAL DAILY
Qty: 90 TABLET | Refills: 1 | Status: SHIPPED | OUTPATIENT
Start: 2024-08-14

## 2024-08-20 ENCOUNTER — APPOINTMENT (OUTPATIENT)
Dept: UROLOGY | Facility: HOSPITAL | Age: 89
End: 2024-08-20
Payer: MEDICARE

## 2024-08-21 ENCOUNTER — PATIENT OUTREACH (OUTPATIENT)
Dept: PRIMARY CARE | Facility: CLINIC | Age: 89
End: 2024-08-21
Payer: MEDICARE

## 2024-08-22 ENCOUNTER — PATIENT OUTREACH (OUTPATIENT)
Dept: PRIMARY CARE | Facility: CLINIC | Age: 89
End: 2024-08-22
Payer: MEDICARE

## 2024-08-22 NOTE — PROGRESS NOTES
Discharge Facility: [facility]  Discharge Diagnosis: COVID  Admission Date: 16 Aug 24  Discharge Date: 20 Aug 2    PCP Appointment Date: Tasked to office for scheduling  Specialist Appointment Date: 17 Sep 24 ([facility])  Hospital Encounter and Summary Linked: No    No contact on discharge outreach after 2 attempts. Tasked to office for scheduling. Will attempt outreach again in 2 wks.

## 2024-08-23 ENCOUNTER — APPOINTMENT (OUTPATIENT)
Dept: PHYSICAL THERAPY | Facility: CLINIC | Age: 89
End: 2024-08-23
Payer: MEDICARE

## 2024-08-26 ENCOUNTER — TELEPHONE (OUTPATIENT)
Dept: PRIMARY CARE | Facility: CLINIC | Age: 89
End: 2024-08-26

## 2024-08-26 ENCOUNTER — DOCUMENTATION (OUTPATIENT)
Dept: HOME HEALTH SERVICES | Facility: HOME HEALTH | Age: 89
End: 2024-08-26

## 2024-08-26 ENCOUNTER — HOME HEALTH ADMISSION (OUTPATIENT)
Dept: HOME HEALTH SERVICES | Facility: HOME HEALTH | Age: 89
End: 2024-08-26
Payer: MEDICARE

## 2024-08-26 ENCOUNTER — APPOINTMENT (OUTPATIENT)
Dept: PRIMARY CARE | Facility: CLINIC | Age: 89
End: 2024-08-26
Payer: MEDICARE

## 2024-08-26 DIAGNOSIS — R53.1 GENERALIZED WEAKNESS: ICD-10-CM

## 2024-08-26 DIAGNOSIS — B02.9 HERPES ZOSTER WITHOUT COMPLICATION: ICD-10-CM

## 2024-08-26 DIAGNOSIS — C61 MALIGNANT NEOPLASM OF PROSTATE (MULTI): ICD-10-CM

## 2024-08-26 DIAGNOSIS — U07.1 COVID-19: Primary | ICD-10-CM

## 2024-08-26 DIAGNOSIS — R26.81 UNSTEADY GAIT: ICD-10-CM

## 2024-08-26 DIAGNOSIS — G61.0 GUILLAIN-BARRE SYNDROME (MULTI): ICD-10-CM

## 2024-08-26 PROCEDURE — 1157F ADVNC CARE PLAN IN RCRD: CPT | Performed by: NURSE PRACTITIONER

## 2024-08-26 PROCEDURE — 1160F RVW MEDS BY RX/DR IN RCRD: CPT | Performed by: NURSE PRACTITIONER

## 2024-08-26 PROCEDURE — 1036F TOBACCO NON-USER: CPT | Performed by: NURSE PRACTITIONER

## 2024-08-26 PROCEDURE — 1123F ACP DISCUSS/DSCN MKR DOCD: CPT | Performed by: NURSE PRACTITIONER

## 2024-08-26 PROCEDURE — 99495 TRANSJ CARE MGMT MOD F2F 14D: CPT | Performed by: NURSE PRACTITIONER

## 2024-08-26 PROCEDURE — 1159F MED LIST DOCD IN RCRD: CPT | Performed by: NURSE PRACTITIONER

## 2024-08-26 RX ORDER — ALBUTEROL SULFATE 90 UG/1
2 INHALANT RESPIRATORY (INHALATION) EVERY 4 HOURS PRN
COMMUNITY
Start: 2024-08-15

## 2024-08-26 RX ORDER — FLUTICASONE PROPIONATE 50 MCG
SPRAY, SUSPENSION (ML) NASAL EVERY 24 HOURS
COMMUNITY

## 2024-08-26 RX ORDER — FAMCICLOVIR 500 MG/1
500 TABLET ORAL 3 TIMES DAILY
Qty: 21 TABLET | Refills: 0 | Status: SHIPPED | OUTPATIENT
Start: 2024-08-26 | End: 2024-09-02

## 2024-08-26 RX ORDER — NIRMATRELVIR AND RITONAVIR 300-100 MG
KIT ORAL
COMMUNITY
Start: 2024-08-15

## 2024-08-26 RX ORDER — IPRATROPIUM BROMIDE AND ALBUTEROL SULFATE 2.5; .5 MG/3ML; MG/3ML
SOLUTION RESPIRATORY (INHALATION)
COMMUNITY
Start: 2024-08-15

## 2024-08-26 ASSESSMENT — ENCOUNTER SYMPTOMS
PALPITATIONS: 0
FEVER: 0
COUGH: 1
CHOKING: 0
CHILLS: 0
FATIGUE: 1
CHEST TIGHTNESS: 0
SHORTNESS OF BREATH: 1
WHEEZING: 0

## 2024-08-26 ASSESSMENT — PATIENT HEALTH QUESTIONNAIRE - PHQ9
1. LITTLE INTEREST OR PLEASURE IN DOING THINGS: NOT AT ALL
SUM OF ALL RESPONSES TO PHQ9 QUESTIONS 1 AND 2: 0
2. FEELING DOWN, DEPRESSED OR HOPELESS: NOT AT ALL

## 2024-08-26 NOTE — HH CARE COORDINATION
Home Care received a Referral for Physical Therapy. We have processed the referral for a Start of Care on 24-48 HOURS .     If you have any questions or concerns, please feel free to contact us at [phone]. Follow the prompts, enter your five digit zip code, and you will be directed to your care team on [facility].

## 2024-08-26 NOTE — PROGRESS NOTES
Subjective   Mic Saunders is a 89 y.o. male who presents for Hospital Follow-up (TCM).    An interactive audio and video telecommunication system which permits real time communications between the patient (at the originating site) and provider (at the distant site) was utilized to provide this telehealth service.    Verbal consent was requested and obtained from Mic Saunders for a telehealth visit. All issues as below were discussed and addressed but no physical exam was performed. If it was felt that the patient should be evaluated in the clinic then they were directed there. The patient verbally consented to the visit.  He was located in the [facility] for the visit.       HPI  He presents today virtually along with his son and daughter for a hospital follow-up.  He was initially noted to have a productive cough as well as elevated blood pressure readings at home.  On 8/15/2024 he tested positive for COVID-19.  He remained at home and had a telehealth visit with his pulmonologist.  He was started on Paxlovid.  Son reports his oxygen levels were in the upper 80s.  He was confused at times and weak.  Then 1 morning the son found him laying on the floor unable to get up.  He contacted the fire department.  Given his weakness, he was sent to the ER for evaluation.  He was admitted to [facility].  He spent roughly 5 to 6 days there.  He had 1 fall while in the hospital while attempting to get out of bed.  He was discharged home with physical therapy orders.  His son reports he is pretty weak getting around the home.  He has not been able to get him out of the house.  He is requesting home PT for now.  The patient reports he is feeling about the same.  His shortness of breath is at baseline.  He is using a nebulizer once a day now. No chest pain or palpitations.  No fever or chills.  His cough remains productive.  His appetite has not been great.  He he is ambulating around the house with a walker.  He has  a stair lift to get up the steps.    His son also reports about a week ago he developed the same rash he usually gets to the left buttock.  This is not painful or itchy.  He has several clustered blisters noted to the left buttock.  Son is concerned it is shingles.    Today his blood pressure was 134/72 during the visit, heart rate 71, SpO2 94% on room air.    Review of Systems   Constitutional:  Positive for fatigue. Negative for chills and fever.   Respiratory:  Positive for cough and shortness of breath. Negative for choking, chest tightness and wheezing.    Cardiovascular:  Positive for leg swelling. Negative for chest pain and palpitations.   Skin:  Positive for rash.       Objective   There were no vitals taken for this visit.    Physical Exam  Constitutional:       General: He is not in acute distress.     Appearance: Normal appearance. He is not toxic-appearing.   Pulmonary:      Effort: Pulmonary effort is normal. No accessory muscle usage or respiratory distress.   Skin:            Comments: (+)vesicular rash noted to left buttocl   Neurological:      Mental Status: He is alert.         Assessment/Plan   Problem List Items Addressed This Visit       Guillain-[place] syndrome (Multi)     History of Guillain-Barré syndrome.  Still has some lingering neuropathy.         Malignant neoplasm of prostate (Multi)     History of prostate cancer.  Does not wish to have any further follow-ups.          Other Visit Diagnoses       COVID-19    -  Primary    Relevant Orders    Referral to Home Care    Generalized weakness        Relevant Orders    Referral to Home Care    Unsteady gait        Relevant Orders    Referral to Home Care    Herpes zoster without complication        Relevant Medications    famciclovir (Famvir) 500 mg tablet        From a COVID-19 standpoint he is doing better.  However, he continues with weakness since being in the hospital for 5 to 6 days.  He would benefit from home health care PT.  Will  start famciclovir for herpes zoster.  They need to start ASAP.  They should keep me posted on symptoms.    It has been a pleasure seeing you today!

## 2024-08-26 NOTE — TELEPHONE ENCOUNTER
Denny is calling for his father. He is wondering if there is something to give pt to help sleep throughout the night- he has been waking up. Pt forgot to ask today while on VV. Please advise

## 2024-08-27 NOTE — PROGRESS NOTES
Subjective   Mic Saunders is a 89 y.o. male who presents for Hospital Follow-up (TCM).    An interactive audio and video telecommunication system which permits real time communications between the patient (at the originating site) and provider (at the distant site) was utilized to provide this telehealth service.    Verbal consent was requested and obtained from Mic Saunders for a telehealth visit. All issues as below were discussed and addressed but no physical exam was performed. If it was felt that the patient should be evaluated in the clinic then they were directed there. The patient verbally consented to the visit.  He was located in the [facility] for the visit.       HPI  He presents today virtually along with his son and daughter for a hospital follow-up.  He was initially noted to have a productive cough as well as elevated blood pressure readings at home.  On 8/15/2024 he tested positive for COVID-19.  He remained at home and had a telehealth visit with his pulmonologist.  He was started on Paxlovid.  Son reports his oxygen levels were in the upper 80s-low 90's  He was confused at times and weak.  Then one morning the son found him laying on the floor unable to get up.  He contacted the fire department.  Given his weakness, he was sent to the ER for evaluation.  He was admitted to [facility].  He spent roughly 5 to 6 days there.  He had 1 fall while in the hospital while attempting to get out of bed.  He was discharged home with physical therapy orders.  His son reports he is pretty weak getting around the home.  He has not been able to get him out of the house for his PT sessions.  He is requesting home PT for now.  The patient reports he is feeling about the same.  His shortness of breath is at baseline.  He is using a nebulizer once a day now. No chest pain or palpitations.  No fever or chills.  His cough remains productive.  His appetite has not been great.  He he is ambulating around the  house with a walker.  He has a stair lift to get up the steps.    His son also reports about a week ago he developed the same rash he usually gets to the left buttock.  This is not painful or itchy.  He has several clustered blisters noted to the left buttock.  Son is concerned it is shingles.    Today his blood pressure was 134/72 during the visit, heart rate 71, SpO2 94% on room air.    Review of Systems   Constitutional:  Positive for fatigue. Negative for chills and fever.   Respiratory:  Positive for cough and shortness of breath. Negative for choking, chest tightness and wheezing.    Cardiovascular:  Positive for leg swelling. Negative for chest pain and palpitations.   Skin:  Positive for rash.       Objective   There were no vitals taken for this visit.    Physical Exam  Constitutional:       General: He is not in acute distress.     Appearance: Normal appearance. He is not toxic-appearing.   Pulmonary:      Effort: Pulmonary effort is normal. No accessory muscle usage or respiratory distress.   Skin:            Comments: (+)vesicular rash noted to left buttocl   Neurological:      Mental Status: He is alert.         Assessment/Plan   Problem List Items Addressed This Visit       Guillain-[place] syndrome (Multi)     History of Guillain-Barré syndrome.  Still has some lingering neuropathy.         Malignant neoplasm of prostate (Multi)     History of prostate cancer.  Does not wish to have any further follow-ups.          Other Visit Diagnoses       COVID-19    -  Primary    Relevant Orders    Referral to Home Care    Generalized weakness        Relevant Orders    Referral to Home Care    Unsteady gait        Relevant Orders    Referral to Home Care    Herpes zoster without complication        Relevant Medications    famciclovir (Famvir) 500 mg tablet        From a COVID-19 standpoint he is doing better.  However, he continues with weakness since being in the hospital for 5 to 6 days.  He would benefit from  home health care PT.  Will start famciclovir for herpes zoster.  They need to start ASAP.  They should keep me posted on symptoms.    It has been a pleasure seeing you today!

## 2024-09-02 ENCOUNTER — HOME CARE VISIT (OUTPATIENT)
Dept: HOME HEALTH SERVICES | Facility: HOME HEALTH | Age: 89
End: 2024-09-02
Payer: MEDICARE

## 2024-09-02 VITALS
HEIGHT: 67 IN | RESPIRATION RATE: 20 BRPM | DIASTOLIC BLOOD PRESSURE: 86 MMHG | TEMPERATURE: 97.8 F | OXYGEN SATURATION: 96 % | BODY MASS INDEX: 34.53 KG/M2 | SYSTOLIC BLOOD PRESSURE: 157 MMHG | WEIGHT: 220 LBS | HEART RATE: 82 BPM

## 2024-09-02 PROCEDURE — G0151 HHCP-SERV OF PT,EA 15 MIN: HCPCS

## 2024-09-02 ASSESSMENT — BALANCE ASSESSMENTS
TURNING 360 DEGREES STEPS: 0 - DISCONTINUOUS STEPS
NUDGED SCORE: 1
BALANCE SCORE: 11
SITTING BALANCE: 1 - STEADY, SAFE
IMMEDIATE STANDING BALANCE FIRST 5 SECONDS: 2 - STEADY WITHOUT WALKER OR OTHER SUPPORT
EYES CLOSED AT MAXIMUM POSITION NUDGED: 1 - STEADY
STANDING BALANCE: 2 - NARROW STANCE WITHOUT SUPPORT
ATTEMPTS TO ARISE: 2 - ABLE TO RISE, ONE ATTEMPT
SITTING DOWN: 1 - USES ARMS OR NOT SMOOTH MOTION
ARISING SCORE: 1
ARISES: 1 - ABLE, USES ARMS TO HELP
NUDGED: 1 - STAGGERS, GRABS, CATCHES SELF

## 2024-09-02 ASSESSMENT — ENCOUNTER SYMPTOMS
PAIN LOCATION: RIGHT FOOT
SUBJECTIVE PAIN PROGRESSION: UNCHANGED
PAIN: 1
PAIN LOCATION: LEFT FOOT
PERSON REPORTING PAIN: PATIENT

## 2024-09-02 ASSESSMENT — GAIT ASSESSMENTS
STEP CONTINUITY: 1 - STEPS APPEAR CONTINUOUS
WALKING STANCE: 0 - HEELS APART
PATH: 1 - MILD/MODERATE DEVIATION OR USES WALKING AID
INITIATION OF GAIT IMMEDIATELY AFTER GO: 1 - NO HESITANCY
GAIT SCORE: 8
STEP SYMMETRY: 1 - RIGHT AND LEFT STEP LENGTH APPEAR EQUAL
TRUNK SCORE: 0
TRUNK: 0 - MARKED SWAY OR USES WALKING AID
PATH SCORE: 1
BALANCE AND GAIT SCORE: 19

## 2024-09-02 ASSESSMENT — ACTIVITIES OF DAILY LIVING (ADL)
ENTERING_EXITING_HOME: STAND BY ASSIST
OASIS_M1830: 03
AMBULATION ASSISTANCE ON FLAT SURFACES: 1
AMBULATION_DISTANCE/DURATION_TOLERATED: HOUSEHOLD

## 2024-09-04 ENCOUNTER — HOME CARE VISIT (OUTPATIENT)
Dept: HOME HEALTH SERVICES | Facility: HOME HEALTH | Age: 89
End: 2024-09-04
Payer: MEDICARE

## 2024-09-04 ENCOUNTER — PATIENT OUTREACH (OUTPATIENT)
Dept: PRIMARY CARE | Facility: CLINIC | Age: 89
End: 2024-09-04
Payer: MEDICARE

## 2024-09-04 PROCEDURE — G0151 HHCP-SERV OF PT,EA 15 MIN: HCPCS

## 2024-09-04 SDOH — ECONOMIC STABILITY: HOUSING INSECURITY: HOME SAFETY: NO CHANGE

## 2024-09-04 ASSESSMENT — ENCOUNTER SYMPTOMS
SUBJECTIVE PAIN PROGRESSION: UNCHANGED
PAIN: 1
PERSON REPORTING PAIN: PATIENT

## 2024-09-04 NOTE — PROGRESS NOTES
Call regarding appt. with PCP on (26 Aug 24) after hospitalization.  At time of outreach call the patient feels as if their condition has improved since last visit.  Reviewed the PCP appointment with the pt and addressed any questions or concerns.

## 2024-09-09 ENCOUNTER — HOME CARE VISIT (OUTPATIENT)
Dept: HOME HEALTH SERVICES | Facility: HOME HEALTH | Age: 89
End: 2024-09-09
Payer: MEDICARE

## 2024-09-09 PROCEDURE — G0151 HHCP-SERV OF PT,EA 15 MIN: HCPCS

## 2024-09-09 ASSESSMENT — ACTIVITIES OF DAILY LIVING (ADL)
AMBULATION_DISTANCE/DURATION_TOLERATED: HOUSEHOLD
AMBULATION ASSISTANCE ON FLAT SURFACES: 1

## 2024-09-11 ENCOUNTER — HOME CARE VISIT (OUTPATIENT)
Dept: HOME HEALTH SERVICES | Facility: HOME HEALTH | Age: 89
End: 2024-09-11
Payer: MEDICARE

## 2024-09-11 PROCEDURE — G0151 HHCP-SERV OF PT,EA 15 MIN: HCPCS

## 2024-09-11 SDOH — ECONOMIC STABILITY: HOUSING INSECURITY: HOME SAFETY: NO CHANGE

## 2024-09-11 ASSESSMENT — ACTIVITIES OF DAILY LIVING (ADL)
AMBULATION_DISTANCE/DURATION_TOLERATED: HOUSEHOLD
AMBULATION ASSISTANCE ON FLAT SURFACES: 1

## 2024-09-11 ASSESSMENT — ENCOUNTER SYMPTOMS
PERSON REPORTING PAIN: PATIENT
DENIES PAIN: 1

## 2024-09-16 ENCOUNTER — APPOINTMENT (OUTPATIENT)
Dept: PRIMARY CARE | Facility: CLINIC | Age: 89
End: 2024-09-16
Payer: MEDICARE

## 2024-09-16 ENCOUNTER — HOME CARE VISIT (OUTPATIENT)
Dept: HOME HEALTH SERVICES | Facility: HOME HEALTH | Age: 89
End: 2024-09-16
Payer: MEDICARE

## 2024-09-16 PROCEDURE — G0151 HHCP-SERV OF PT,EA 15 MIN: HCPCS

## 2024-09-16 SDOH — ECONOMIC STABILITY: HOUSING INSECURITY: HOME SAFETY: NO CHANGE

## 2024-09-16 ASSESSMENT — ENCOUNTER SYMPTOMS
PERSON REPORTING PAIN: PATIENT
DENIES PAIN: 1

## 2024-09-16 ASSESSMENT — ACTIVITIES OF DAILY LIVING (ADL)
AMBULATION ASSISTANCE ON FLAT SURFACES: 1
AMBULATION_DISTANCE/DURATION_TOLERATED: SHORT COMMUNITY

## 2024-09-17 ENCOUNTER — APPOINTMENT (OUTPATIENT)
Dept: OTOLARYNGOLOGY | Facility: CLINIC | Age: 89
End: 2024-09-17
Payer: MEDICARE

## 2024-09-17 VITALS — HEIGHT: 67 IN | WEIGHT: 220 LBS | BODY MASS INDEX: 34.53 KG/M2

## 2024-09-17 DIAGNOSIS — H90.3 ASYMMETRIC SNHL (SENSORINEURAL HEARING LOSS): Primary | ICD-10-CM

## 2024-09-17 DIAGNOSIS — R42 VERTIGO: ICD-10-CM

## 2024-09-17 PROCEDURE — 1159F MED LIST DOCD IN RCRD: CPT | Performed by: STUDENT IN AN ORGANIZED HEALTH CARE EDUCATION/TRAINING PROGRAM

## 2024-09-17 PROCEDURE — 99213 OFFICE O/P EST LOW 20 MIN: CPT | Performed by: STUDENT IN AN ORGANIZED HEALTH CARE EDUCATION/TRAINING PROGRAM

## 2024-09-17 PROCEDURE — 1036F TOBACCO NON-USER: CPT | Performed by: STUDENT IN AN ORGANIZED HEALTH CARE EDUCATION/TRAINING PROGRAM

## 2024-09-17 PROCEDURE — 1157F ADVNC CARE PLAN IN RCRD: CPT | Performed by: STUDENT IN AN ORGANIZED HEALTH CARE EDUCATION/TRAINING PROGRAM

## 2024-09-17 PROCEDURE — 1123F ACP DISCUSS/DSCN MKR DOCD: CPT | Performed by: STUDENT IN AN ORGANIZED HEALTH CARE EDUCATION/TRAINING PROGRAM

## 2024-09-17 NOTE — PROGRESS NOTES
HPI  9/17/24  The patient present for follow-up, developed COVID-19 which he is currently recovering from.  Has not proceeded with physical therapy for canalith repositioning maneuvers.  Endorses brief episodes of vertigo while turning in bed lasting less than a minute.  Recall   Mic Saunders is a 89 y.o. male presenting for evaluation of hearing loss, vertigo and right otitis.  The patient has a long history of hearing loss for which she uses hearing aids bilaterally.  Reports developing an episode of right otitis associated with discomfort using his hearing aid.  He was evaluated in the ED and underwent treatment with antibiotic eardrops with significant improvement of his ear discomfort.  In addition the patient endorses brief vertiginous starting 6 weeks ago.  Episodes of vertigo last less than a minute and are triggered by turning in supine position.  Reports his vertigo has improved but not completely resolved.  Denies current ear pain, discharge from ear, tinnitus, aural fullness or autophony.   Denies history of prior ear surgery other than ear tube placement.     Past Medical History:   Diagnosis Date    Acute bronchitis due to other specified organisms 03/19/2019    Acute bacterial bronchitis    Anemia     Benign paroxysmal vertigo, unspecified ear 08/02/2019    Benign paroxysmal positional vertigo    BPH (benign prostatic hyperplasia) 02/13/2018    Cataract     Chronic fatigue, unspecified 03/10/2016    Chronic fatigue    Chronic rhinitis 05/31/2018    Rhinitis    COPD (chronic obstructive pulmonary disease) (Multi)     Cramp and spasm 05/05/2016    Muscle cramps at night    Dermatitis, unspecified 07/21/2013    Dermatitis, eczematoid    Disease of thyroid gland     Disorder of thyroid, unspecified     Thyroid trouble    Dizziness     Effusion, unspecified ankle 09/25/2018    Ankle edema    Elevated PSA     Emphysema of lung (Multi)     Encounter for preprocedural laboratory examination     Blood tests  prior to treatment or procedure    Erythema intertrigo 07/21/2013    Intertrigo    Generalized hyperhidrosis 07/21/2013    Excessive sweating    GERD (gastroesophageal reflux disease)     Heart disease     HL (hearing loss)     Hypertension     Impacted cerumen, bilateral 08/05/2019    Excessive cerumen in both ear canals    Left lower quadrant pain 10/17/2014    Abdominal pain, LLQ (left lower quadrant)    Lymphedema, not elsewhere classified 03/09/2017    Lymphedema of arm    Malignant neoplasm of prostate (Multi)     Prostate cancer    Meniere's disease, 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    Meniere's disease    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    Narcolepsy    Neuromuscular disorder (Multi)     Other complications following immunization, not elsewhere classified, initial encounter 10/25/2016    Guillain-[place] syndrome following vaccination    Other conditions influencing health status 12/22/2015    History of cough    Other postherpetic nervous system involvement 07/21/2013    Postherpetic neuralgia    Other specified soft tissue disorders 05/08/2019    Other disorders of soft tissue    Personal history of diseases of the skin and subcutaneous tissue 04/13/2017    History of actinic keratosis    Personal history of malignant melanoma of skin 10/25/2016    History of malignant melanoma    Personal history of malignant neoplasm of prostate     History of malignant neoplasm of prostate    Personal history of other diseases of the circulatory system     History of hypertension    Personal history of other diseases of the digestive system 03/10/2016    History of rectal bleeding    Personal history of other diseases of the respiratory system 08/04/2014    History of upper respiratory infection    Personal history of other diseases of the respiratory system 09/14/2015    History of sinusitis    Personal history of other diseases of the respiratory system 12/07/2015    History of bronchitis    Personal history  of other specified conditions 10/17/2018    History of dysarthria    Personal history of other specified conditions 02/27/2015    History of short term memory loss    Personal history of other specified conditions 03/09/2017    History of persistent cough    Personal history of other specified conditions 09/26/2017    History of chronic fatigue    Personal history of other specified conditions 12/22/2015    History of hemoptysis    Pressure ulcer of unspecified buttock, unspecified stage     Pressure sore on buttocks    Prostate cancer (Multi)     Sciatica, left side 10/18/2016    Sciatica of left side    Sleep apnea     Stroke (Multi)     Tachypnea, not elsewhere classified 05/05/2016    Tachypnea    Urinary incontinence     Viral wart, unspecified 07/21/2013    Warts    Visual impairment        Past Surgical History:   Procedure Laterality Date    EYE SURGERY      OTHER SURGICAL HISTORY  05/15/2014    Biopsy Nasal Cavity    OTHER SURGICAL HISTORY  03/10/2014    Skin Tag Removal    PROSTATE SURGERY  03/10/2014    Prostate Surgery    TONSILLECTOMY  05/15/2014    Tonsillectomy    VASECTOMY           Current Outpatient Medications on File Prior to Visit   Medication Sig Dispense Refill    albuterol 90 mcg/actuation inhaler Inhale 2 puffs every 4 hours if needed.      aspirin 81 mg EC tablet Take 1 tablet (81 mg) by mouth once daily. 90 tablet 1    atorvastatin (Lipitor) 20 mg tablet Take 1 tablet (20 mg) by mouth once daily. 90 tablet 1    clopidogrel (Plavix) 75 mg tablet Take 1 tablet (75 mg) by mouth once daily. 90 tablet 1    fluticasone (Flonase) 50 mcg/actuation nasal spray Administer into affected nostril(s) once every 24 hours.      ipratropium-albuteroL (Duo-Neb) 0.5-2.5 mg/3 mL nebulizer solution USE 3 ML VIA NEBULIZER EVERY 6 HOURS AS NEEDED      levothyroxine (Synthroid, Levoxyl) 100 mcg tablet Take one tablet (100 mcg) by mouth every other day. 45 tablet 1    losartan (Cozaar) 100 mg tablet Take 1 tablet  (100 mg) by mouth once daily. 90 tablet 1    pantoprazole (ProtoNix) 40 mg EC tablet Take 1 tablet (40 mg) by mouth once daily. 90 tablet 1    Trelegy Ellipta 100-62.5-25 mcg blister with device Inhale 1 puff once daily.      azelastine (Astelin) 137 mcg (0.1 %) nasal spray SPRAY ONE TIME IN EACH NOSTRIL TWICE DAILY      Paxlovid 300 mg (150 mg x 2)-100 mg tablet therapy pack TK 2 NIRMATRELVIR TS AND 1 RITONAVIR T TOGETHER PO TWICE DAILY FOR 5 DAYS       No current facility-administered medications on file prior to visit.        Allergies   Allergen Reactions    Shellfish Derived Headache and Shortness of breath    Metoprolol Dizziness    [place] Other        Review of Systems  A detailed 12 point ROS was performed and is negative except as noted in the intake form, HPI and/or Past Medical History        Physical Exam   CONSTITUTIONAL: Well developed, well nourished.  VOICE: Normal voice quality  RESPIRATION: Breathing comfortably, no stridor.  CV: No clubbing/cyanosis/edema in hands.  EYES: EOM Intact, sclera normal.  NEURO: Alert and oriented times 3, Cranial nerves V,VII intact and symmetric bilaterally.  HEAD AND FACE: Symmetric facial features, no masses or lesions, sinuses nontender to palpation.  SALIVARY GLANDS: Parotid and submandibular glands normal bilaterally.  + EARS: Normal external ears  Right EAC patent, tympanic membrane intact  Left EAC patent, tympanic membrane intact, retracted, sclerotic   NOSE: External nose midline, anterior rhinoscopy is normal with limited visualization to the anterior aspect of the interior turbinates. No lesions noted.  ORAL CAVITY/OROPHARYNX/LIPS: Normal mucous membranes, normal floor of mouth/tongue/OP, no masses or lesions are noted.  PHARYNGEAL WALLS AND NASOPHARYNX: No masses noted. Mucosa appears clean and moist  NECK/LYMPH: No LAD, no thyroid masses. Trachea palpably midline  SKIN: Neck skin is without injury  PSYCH: Alert and oriented with appropriate mood and  affect         Results:   Audiogram 8/2/2024 reviewed:  Right moderate sloping to profound sensorineural hearing loss.  Speech discrimination score 24%.  Type a tympanogram.  Left: Mild sloping to profound sensorineural hearing loss.  Speech discrimination score 4%.  Type B tympanogram.      Assessment  Vertigo  Asymmetric sensorineural hearing loss    Plan  Developed COVID-19 which he is currently recovering from.  He will proceed with PT for canalith repositioning maneuvers.  No evidence of infection or middle ear effusion noted today on exam, he does have left tympanosclerosis and retraction limiting TM movement.  Audiogram 8/2/24 notable for bilateral sensorineural hearing loss with low-frequency bone line asymmetry and poor speech discrimination score in both ears.  Multiple diagnostic alternatives discussed, he will like to defer additional imaging/MRI.  He recently had a CT head which did not show intracranial abnormality or masses.   Treatment alternatives for SNHL discussed, he is not interested in surgical options, will like to continue hearing aid use.   RTC 2m

## 2024-09-18 ENCOUNTER — HOME CARE VISIT (OUTPATIENT)
Dept: HOME HEALTH SERVICES | Facility: HOME HEALTH | Age: 89
End: 2024-09-18
Payer: MEDICARE

## 2024-09-18 ENCOUNTER — PATIENT OUTREACH (OUTPATIENT)
Dept: PRIMARY CARE | Facility: CLINIC | Age: 89
End: 2024-09-18
Payer: MEDICARE

## 2024-09-18 PROCEDURE — G0151 HHCP-SERV OF PT,EA 15 MIN: HCPCS

## 2024-09-23 ENCOUNTER — HOME CARE VISIT (OUTPATIENT)
Dept: HOME HEALTH SERVICES | Facility: HOME HEALTH | Age: 89
End: 2024-09-23
Payer: MEDICARE

## 2024-09-23 PROCEDURE — G0151 HHCP-SERV OF PT,EA 15 MIN: HCPCS

## 2024-09-23 ASSESSMENT — GAIT ASSESSMENTS
PATH SCORE: 2
BALANCE AND GAIT SCORE: 24
GAIT SCORE: 9
PATH: 2 - STRAIGHT WITHOUT WALKING AID
INITIATION OF GAIT IMMEDIATELY AFTER GO: 1 - NO HESITANCY
STEP SYMMETRY: 1 - RIGHT AND LEFT STEP LENGTH APPEAR EQUAL
WALKING STANCE: 1 - HEELS ALMOST TOUCHING WHILE WALKING
STEP CONTINUITY: 1 - STEPS APPEAR CONTINUOUS
TRUNK SCORE: 1
TRUNK: 1 - NO SWAY BUT FLEXION OF KNEES OR BACK OR SPREADS ARMS WHILE WALKING

## 2024-09-23 ASSESSMENT — BALANCE ASSESSMENTS
NUDGED: 2 - STEADY
ATTEMPTS TO ARISE: 2 - ABLE TO RISE, ONE ATTEMPT
TURNING 360 DEGREES STEPS: 1 - CONTINUOUS STEPS
NUDGED SCORE: 2
BALANCE SCORE: 15
STANDING BALANCE: 2 - NARROW STANCE WITHOUT SUPPORT
SITTING DOWN: 2 - SAFE, SMOOTH MOTION
SITTING BALANCE: 1 - STEADY, SAFE
EYES CLOSED AT MAXIMUM POSITION NUDGED: 1 - STEADY
IMMEDIATE STANDING BALANCE FIRST 5 SECONDS: 2 - STEADY WITHOUT WALKER OR OTHER SUPPORT
ARISING SCORE: 2
ARISES: 2 - ABLE WITHOUT USING ARMS

## 2024-09-23 ASSESSMENT — ACTIVITIES OF DAILY LIVING (ADL)
AMBULATION ASSISTANCE ON FLAT SURFACES: 1
AMBULATION_DISTANCE/DURATION_TOLERATED: HOUSEHOLD
OASIS_M1830: 00
HOME_HEALTH_OASIS: 00

## 2024-09-23 ASSESSMENT — ENCOUNTER SYMPTOMS
PERSON REPORTING PAIN: PATIENT
DENIES PAIN: 1

## 2024-10-22 DIAGNOSIS — K21.9 GASTROESOPHAGEAL REFLUX DISEASE WITHOUT ESOPHAGITIS: ICD-10-CM

## 2024-10-22 RX ORDER — PANTOPRAZOLE SODIUM 40 MG/1
40 TABLET, DELAYED RELEASE ORAL DAILY
Qty: 90 TABLET | Refills: 1 | Status: SHIPPED | OUTPATIENT
Start: 2024-10-22

## 2024-10-22 NOTE — TELEPHONE ENCOUNTER
Fax pharmacy for med refills on    Pantoprazole 40mg    LOV: 08/26/24  NOV: 01/21/25    Jed Jean Baptiste

## 2024-11-11 ENCOUNTER — CLINICAL SUPPORT (OUTPATIENT)
Dept: PHYSICAL THERAPY | Facility: CLINIC | Age: 89
End: 2024-11-11
Payer: MEDICARE

## 2024-11-11 ENCOUNTER — APPOINTMENT (OUTPATIENT)
Dept: ORTHOPEDIC SURGERY | Facility: CLINIC | Age: 89
End: 2024-11-11
Payer: MEDICARE

## 2024-11-11 VITALS — WEIGHT: 220 LBS | HEIGHT: 67 IN | BODY MASS INDEX: 34.53 KG/M2

## 2024-11-11 DIAGNOSIS — R42 VERTIGO: Primary | ICD-10-CM

## 2024-11-11 DIAGNOSIS — M25.572 LEFT ANKLE PAIN, UNSPECIFIED CHRONICITY: ICD-10-CM

## 2024-11-11 DIAGNOSIS — M17.31 POST-TRAUMATIC OSTEOARTHRITIS OF RIGHT KNEE: Primary | ICD-10-CM

## 2024-11-11 PROCEDURE — 1036F TOBACCO NON-USER: CPT | Performed by: STUDENT IN AN ORGANIZED HEALTH CARE EDUCATION/TRAINING PROGRAM

## 2024-11-11 PROCEDURE — 1157F ADVNC CARE PLAN IN RCRD: CPT | Performed by: STUDENT IN AN ORGANIZED HEALTH CARE EDUCATION/TRAINING PROGRAM

## 2024-11-11 PROCEDURE — 20610 DRAIN/INJ JOINT/BURSA W/O US: CPT | Performed by: STUDENT IN AN ORGANIZED HEALTH CARE EDUCATION/TRAINING PROGRAM

## 2024-11-11 PROCEDURE — 1159F MED LIST DOCD IN RCRD: CPT | Performed by: STUDENT IN AN ORGANIZED HEALTH CARE EDUCATION/TRAINING PROGRAM

## 2024-11-11 PROCEDURE — 1123F ACP DISCUSS/DSCN MKR DOCD: CPT | Performed by: STUDENT IN AN ORGANIZED HEALTH CARE EDUCATION/TRAINING PROGRAM

## 2024-11-11 PROCEDURE — 1160F RVW MEDS BY RX/DR IN RCRD: CPT | Performed by: STUDENT IN AN ORGANIZED HEALTH CARE EDUCATION/TRAINING PROGRAM

## 2024-11-11 PROCEDURE — 99213 OFFICE O/P EST LOW 20 MIN: CPT | Performed by: STUDENT IN AN ORGANIZED HEALTH CARE EDUCATION/TRAINING PROGRAM

## 2024-11-11 PROCEDURE — 1125F AMNT PAIN NOTED PAIN PRSNT: CPT | Performed by: STUDENT IN AN ORGANIZED HEALTH CARE EDUCATION/TRAINING PROGRAM

## 2024-11-11 PROCEDURE — 97161 PT EVAL LOW COMPLEX 20 MIN: CPT | Mod: GP | Performed by: PHYSICAL THERAPIST

## 2024-11-11 ASSESSMENT — PAIN - FUNCTIONAL ASSESSMENT: PAIN_FUNCTIONAL_ASSESSMENT: 0-10

## 2024-11-11 ASSESSMENT — ENCOUNTER SYMPTOMS
OCCASIONAL FEELINGS OF UNSTEADINESS: 1
DEPRESSION: 0
LOSS OF SENSATION IN FEET: 1

## 2024-11-11 ASSESSMENT — PAIN SCALES - GENERAL: PAINLEVEL_OUTOF10: 8

## 2024-11-11 ASSESSMENT — COLUMBIA-SUICIDE SEVERITY RATING SCALE - C-SSRS: 1. IN THE PAST MONTH, HAVE YOU WISHED YOU WERE DEAD OR WISHED YOU COULD GO TO SLEEP AND NOT WAKE UP?: NO

## 2024-11-11 NOTE — PROGRESS NOTES
Physical Therapy Evaluation    Patient Name: Mic Saunders  MRN: [id number]  Today's Date: 11/11/2024  Visit: 1/**  Referred by: Murali Atwood  Insurance: $20.00 copay, no auth, visits based on [place] verified in [facility] provider portal  11/7 11/7 ded $0 oop $3500 coins 0% $20 copay. Auth required  DOS/DOI: 06/05/24  Time Calculation  Start Time: 1300  Stop Time: 1400  Time Calculation (min): 60 min  Diagnosis:   1. Vertigo  Follow Up In Physical Therapy        Problem List Items Addressed This Visit             ICD-10-CM    Vertigo - Primary R42    Relevant Orders    Follow Up In Physical Therapy     PT Evaluation Time Entry  PT Evaluation (Low) Time Entry: 50                      PRECAUTIONS: HTN, Emphysema, lymphedema RUE  Fall Risk: High    Personal factors impacting care: None    SUBJECTIVE:   Patient reports history of recurrent positional vertigo since 1999. Another bout in 2009. Each time symptoms resolved on their own. Current episode started in June and noted with rolling to his L side in bed.  Pain:   N/A  Home Living:   Lives with his son, no concerns  Prior level of function:   Independent with dressing, feeding, grooming. Son prepares meals and takes care of home.  Current functional limitations: Avoids rolling onto his side with sleeping secondary fear of vertigo onset    OBJECTIVE:  Oculomotor Exam:    VOR horizontal - intact but sees 2 pens with head turn to right    VOR vertical intact and NE    Smooth pursuits- WNL's    Saccades- WNL's    NPC (Conversion) <6 cm is normal- WNL's    VSR Vestibular spinal reflex (head w/ eye movement)- WNL's    Head thrust    Head shake test for nystagmus    Cervical AROM:  Generally decreased mobility all directions    Palpation: NT    Balance:  Static Standing balance Good with manual challenge x 4 directions    Romberg- WNL's minimal sway    Tandem NT    SLS R 1 sec SLS L 1 sec    Transfers: Requires bilateral UE assist sit to hubert    Gait: F+ dynamic balance, does not  use assistive device    Motion Sensitivity:  R sidly test -patient declined at this time  L sidly test - patient declined at this time    Nichol Hallpike:   R/L- Unable to assess due to limited cervical extension mobility  Roll test :  R - NT                 L -NT    Outcome Measure:  Other Measures  Dizziness Handicap Inventory: 20/100    TREATMENT:    PATIENT EDUCATION:  Outpatient Education  Individual(s) Educated: Patient  Education Provided: Anatomy  Risk and Benefits Discussed with Patient/Caregiver/Other: yes  Patient/Caregiver Demonstrated Understanding: yes  Plan of Care Discussed and Agreed Upon: yes  Patient Response to Education: Patient/Caregiver Verbalized Understanding of Information, Patient/Caregiver Asked Appropriate QuestionsReviewed vestibular system anatomy of inner ear and mechanism of what causes BPPV    ASSESSMENT:  Patient is 89 yo male with subjective history that aligns with BPPV. Patient is physically deconditioned with limited cervical mobility and fear of provocation of vertigo with testing today, so motion provocation tests were deferred at this time. Patient also presents with diminished balance, specifically SLS. He would benefit from skilled PT to address these deficits.    PT Assessment Results: Decreased strength, Impaired balance, Impaired hearing (Vertigo)  Rehab Prognosis: Fair  Evaluation/Treatment Tolerance: Patient limited by fatigue  Strengths: Support of Caregivers  Barriers to Participation: Comorbidities. Low complexity due to patient's clinical presentation being stable and uncomplicated by any significant comorbidities that may affect rehab tolerance and progression.     Clinical presentation:  Stable and/or uncomplicated characteristics,     PLAN:   Treatment/Interventions: Canalith repositioning, Education/ Instruction, Manual therapy, Self care/ home management, Therapeutic exercises, Therapeutic activities  PT Plan: Skilled PT  PT Frequency: 1 time per week  Duration:  12 weeks  Onset Date: 06/05/24  Certification Period Start Date: 11/11/24  Certification Period End Date: 02/09/25  Number of Treatments Authorized: Needs auth after first visit  Rehab Potential: Fair  Plan of Care Agreement: Patient    Patient goals: Eliminate vertigo, be able to perform bed mobility without restriction    GOALS:  Active       PT Problem       Patient will report vertigo at 0-1/10 with head and trunk movements       Start:  11/11/24    Expected End:  02/09/25            Patient will perform L sidly without fear of provocation of vertigo to restore bed mobility       Start:  11/11/24    Expected End:  02/09/25            Patient will score <10/100 on DHI       Start:  11/11/24    Expected End:  02/09/25

## 2024-11-11 NOTE — PROGRESS NOTES
PRIMARY CARE PHYSICIAN: Linda Vila, APRN-CNP  REFERRING PROVIDER: No referring provider defined for this encounter.     CONSULT ORTHOPAEDIC: Knee Evaluation    ASSESSMENT & PLAN    Impression: 89 y.o. male with right knee pain secondary to avascular necrosis and early degenerative changes.    Plan:   I explained to the patient the nature of their diagnosis.  I reviewed their imaging studies with them.    Based on the history, physical exam and imaging studies above, the patient's presentation is consistent with the above diagnosis.  I had a long discussion with the patient regarding their presentation and the treatment options.  We discussed initial nonoperative versus operative management options as well as potential further diagnostic imaging.  I reviewed the patient's x-ray findings with him.  He does have findings consistent with avascular necrosis and early degenerative changes.  We discussed treatment options going forward including continued nonoperative management.  I provided him with a corticosteroid injection as described below which she tolerated well.  He will focus on low impact activities.  I offered him physical therapy versus home exercises with the patient notes he is not interested and will do things on his own at home.    Follow-Up: Patient will follow-up with me as needed    At the end of the visit, all questions were answered in full. The patient is in agreement with the plan and recommendations. They will call the office with any questions/concerns.    Note dictated with [employer] software. Completed without full typed error editing and sent to avoid delay.       SUBJECTIVE  CHIEF COMPLAINT:   Chief Complaint   Patient presents with    Right Knee - Follow-up, Pain        HPI: Mic Saunders is a 89 y.o. patient who presents today with right knee pain.  He would like a R knee CSI. Last done 6/5/2024 which lasted for 2 days.  He continues to have pain and dysfunction  related to his right knee osteoarthritis.    6/5/2024 HPI Visit Info:   He is here today for follow-up of his right knee pain and degenerative changes.  He is requesting a repeat corticosteroid injection, previous injection was 3/4/24.    Please see below for full history from prior visit:    Pain has been going on for 3 years.  He notes that the knee pain bothers him in all of his daily activities.  He has difficulty ambulating due to the pain.  He has pain at night.  He has pain with any sort of increased activity.  He states that he previously fell directly onto his knee a number of years ago and his knee pain has progressively worsened since.    They deny any constant or progressive numbness or tingling in their legs.     REVIEW OF SYSTEMS  Constitutional: See HPI for pain assessment, No significant weight loss, recent trauma  Cardiovascular: No chest pain, shortness of breath  Respiratory: No difficulty breathing, cough  Gastrointestinal: No nausea, vomiting, diarrhea, constipation  Musculoskeletal: Noted in HPI, positive for pain, restricted motion, stiffness  Integumentary: No rashes, easy bruising, redness   Neurological: no numbness or tingling in extremities, no gait disturbances   Psychiatric: No mood changes, memory changes, social issues  Heme/Lymph: no excessive swelling, easy bruising, excessive bleeding  ENT: No hearing changes  Eyes: No vision changes    Past Medical History:   Diagnosis Date    Acute bronchitis due to other specified organisms 03/19/2019    Acute bacterial bronchitis    Anemia     Benign paroxysmal vertigo, unspecified ear 08/02/2019    Benign paroxysmal positional vertigo    BPH (benign prostatic hyperplasia) 02/13/2018    Cataract     Chronic fatigue, unspecified 03/10/2016    Chronic fatigue    Chronic rhinitis 05/31/2018    Rhinitis    COPD (chronic obstructive pulmonary disease) (Multi)     Cramp and spasm 05/05/2016    Muscle cramps at night    Dermatitis, unspecified  07/21/2013    Dermatitis, eczematoid    Disease of thyroid gland     Disorder of thyroid, unspecified     Thyroid trouble    Dizziness     Effusion, unspecified ankle 09/25/2018    Ankle edema    Elevated PSA     Emphysema of lung (Multi)     Encounter for preprocedural laboratory examination     Blood tests prior to treatment or procedure    Erythema intertrigo 07/21/2013    Intertrigo    Generalized hyperhidrosis 07/21/2013    Excessive sweating    GERD (gastroesophageal reflux disease)     Heart disease     HL (hearing loss)     Hypertension     Impacted cerumen, bilateral 08/05/2019    Excessive cerumen in both ear canals    Left lower quadrant pain 10/17/2014    Abdominal pain, LLQ (left lower quadrant)    Lymphedema, not elsewhere classified 03/09/2017    Lymphedema of arm    Malignant neoplasm of prostate (Multi)     Prostate cancer    Meniere's disease, 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    Meniere's disease    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    Narcolepsy    Neuromuscular disorder (Multi)     Other complications following immunization, not elsewhere classified, initial encounter 10/25/2016    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    Other conditions influencing health status 12/22/2015    History of cough    Other postherpetic nervous system involvement 07/21/2013    Postherpetic neuralgia    Other specified soft tissue disorders 05/08/2019    Other disorders of soft tissue    Personal history of diseases of the skin and subcutaneous tissue 04/13/2017    History of actinic keratosis    Personal history of malignant melanoma of skin 10/25/2016    History of malignant melanoma    Personal history of malignant neoplasm of prostate     History of malignant neoplasm of prostate    Personal history of other diseases of the circulatory system     History of hypertension    Personal history of other diseases of the digestive system 03/10/2016    History of rectal bleeding    Personal history of  other diseases of the respiratory system 08/04/2014    History of upper respiratory infection    Personal history of other diseases of the respiratory system 09/14/2015    History of sinusitis    Personal history of other diseases of the respiratory system 12/07/2015    History of bronchitis    Personal history of other specified conditions 10/17/2018    History of dysarthria    Personal history of other specified conditions 02/27/2015    History of short term memory loss    Personal history of other specified conditions 03/09/2017    History of persistent cough    Personal history of other specified conditions 09/26/2017    History of chronic fatigue    Personal history of other specified conditions 12/22/2015    History of hemoptysis    Pressure ulcer of unspecified buttock, unspecified stage     Pressure sore on buttocks    Prostate cancer (Multi)     Sciatica, left side 10/18/2016    Sciatica of left side    Sleep apnea     Stroke (Multi)     Tachypnea, not elsewhere classified 05/05/2016    Tachypnea    Urinary incontinence     Viral wart, unspecified 07/21/2013    Warts    Visual impairment         Allergies   Allergen Reactions    Shellfish Derived Headache and Shortness of breath    Metoprolol Dizziness    [place] Other        Past Surgical History:   Procedure Laterality Date    EYE SURGERY      OTHER SURGICAL HISTORY  05/15/2014    Biopsy Nasal Cavity    OTHER SURGICAL HISTORY  03/10/2014    Skin Tag Removal    PROSTATE SURGERY  03/10/2014    Prostate Surgery    TONSILLECTOMY  05/15/2014    Tonsillectomy    VASECTOMY          Family History   Problem Relation Name Age of Onset    Hearing loss Mother      Other (rheumatic heart disease [Other]) Father Dennyfatoumata Saunders     Heart disease Father Denny Saunders         Social History     Socioeconomic History    Marital status:      Spouse name: Not on file    Number of children: Not on file    Years of education: Not on file    Highest education level: Not on file    Occupational History    Not on file   Tobacco Use    Smoking status: Former     Current packs/day: 0.00     Average packs/day: 1.5 packs/day for 29.0 years (43.5 ttl pk-yrs)     Types: Cigarettes     Start date: 1949     Quit date:      Years since quittin.8     Passive exposure: Past    Smokeless tobacco: Never   Vaping Use    Vaping status: Never Used   Substance and Sexual Activity    Alcohol use: Not Currently     Alcohol/week: 1.0 standard drink of alcohol     Comment: rarely    Drug use: Never    Sexual activity: Not Currently     Partners: Female   Other Topics Concern    Not on file   Social History Narrative    Not on file     Social Drivers of Health     Financial Resource Strain: Not on file   Food Insecurity: Not on file   Transportation Needs: No Transportation Needs (2024)    OASIS : Transportation     Lack of Transportation (Medical): No     Lack of Transportation (Non-Medical): No     Patient Unable or Declines to Respond: No   Physical Activity: Not on file   Stress: Not on file   Social Connections: Feeling Socially Integrated (2024)    OASIS : Social Isolation     Frequency of experiencing loneliness or isolation: Never   Intimate Partner Violence: Not on file   Housing Stability: Not on file        CURRENT MEDICATIONS:   Current Outpatient Medications   Medication Sig Dispense Refill    albuterol 90 mcg/actuation inhaler Inhale 2 puffs every 4 hours if needed.      aspirin 81 mg EC tablet Take 1 tablet (81 mg) by mouth once daily. 90 tablet 1    atorvastatin (Lipitor) 20 mg tablet Take 1 tablet (20 mg) by mouth once daily. 90 tablet 1    azelastine (Astelin) 137 mcg (0.1 %) nasal spray SPRAY ONE TIME IN EACH NOSTRIL TWICE DAILY      clopidogrel (Plavix) 75 mg tablet Take 1 tablet (75 mg) by mouth once daily. 90 tablet 1    fluticasone (Flonase) 50 mcg/actuation nasal spray Administer into affected nostril(s) once every 24 hours.      ipratropium-albuteroL  "(Duo-Neb) 0.5-2.5 mg/3 mL nebulizer solution USE 3 ML VIA NEBULIZER EVERY 6 HOURS AS NEEDED      levothyroxine (Synthroid, Levoxyl) 100 mcg tablet Take one tablet (100 mcg) by mouth every other day. 45 tablet 1    losartan (Cozaar) 100 mg tablet Take 1 tablet (100 mg) by mouth once daily. 90 tablet 1    pantoprazole (ProtoNix) 40 mg EC tablet Take 1 tablet (40 mg) by mouth once daily. 90 tablet 1    Paxlovid 300 mg (150 mg x 2)-100 mg tablet therapy pack TK 2 NIRMATRELVIR TS AND 1 RITONAVIR T TOGETHER PO TWICE DAILY FOR 5 DAYS      Trelegy Ellipta 100-62.5-25 mcg blister with device Inhale 1 puff once daily.       No current facility-administered medications for this visit.        OBJECTIVE    PHYSICAL EXAM      6/5/2024    12:16 PM 6/19/2024     1:32 PM 7/10/2024     1:01 PM 8/2/2024     8:38 AM 9/2/2024    12:52 PM 9/17/2024    11:43 AM 11/11/2024    10:36 AM   Vitals   Systolic  140 130  157     Diastolic  60 60  86     Heart Rate  83 79  82     Temp  36.6 °C (97.9 °F) 36.7 °C (98 °F)  36.6 °C (97.8 °F)     Resp     20     Height (in) 1.717 m (5' 7.58\")   1.715 m (5' 7.5\") 1.702 m (5' 7\") 1.702 m (5' 7\") 1.702 m (5' 7\")   Weight (lb) 227 231.2 226 226 220 220 220   BMI 34.95 kg/m2 35.59 kg/m2 34.79 kg/m2 34.87 kg/m2 34.46 kg/m2 34.46 kg/m2 34.46 kg/m2   BSA (m2) 2.22 m2 2.24 m2 2.22 m2 2.21 m2 2.17 m2 2.17 m2 2.17 m2   Visit Report Report Report Report Report  Report Report      Body mass index is 34.46 kg/m².    GENERAL: A/Ox3, NAD. Appears healthy, well nourished  PSYCHIATRIC: Mood stable, appropriate memory recall  EYES: EOM intact, no scleral icterus  CARDIOVASCULAR: Palpable peripheral pulses  LUNGS: Breathing non-labored on room air  SKIN: no erythema, rashes, or ecchymoses     MUSCULOSKELETAL:  Laterality: right Knee Exam  - Skin intact  - No erythema or warmth  - No ecchymosis or soft tissue swelling  - Alignment: varus  - Palpation: Positive tenderness medial and lateral joint lines  - ROM: 0 - 5 - " 120  - Effusion: Small  - Strength: knee extension and flexion 5/5, EHL/PF/DF motor intact  - Stability:        Anterior drawer stable       Posterior drawer stable       Varus/valgus stable       negative Lachman  -Equivocal Ailyn's  - Gait: Antalgic  - Hip Exam: flexion to 100+ degrees, full extension, internal/external rotation adequate, and no pain with log roll    NEUROVASCULAR:  - Neurovascular Status: sensation intact to light touch distally, lower extremity motor intact  - Capillary refill brisk at extremities, Bilateral dorsalis pedis pulse 2+    Imaging: Multiple views of the affected right knee(s) demonstrate: Avascular necrosis of the femur and tibia, degenerative changes tricompartmentally.   X-rays were personally reviewed and interpreted by me.  Radiology reports were reviewed by me as well, if readily available at the time.    L Inj/Asp: R knee on 11/11/2024 11:00 AM  Indications: pain  Details: 25 G needle, superolateral approach  Medications: 40 mg triamcinolone acetonide 40 mg/mL; 2 mL lidocaine 10 mg/mL (1 %)  Outcome: tolerated well, no immediate complications  Procedure, treatment alternatives, risks and benefits explained, specific risks discussed. Consent was given by the patient. Immediately prior to procedure a time out was called to verify the correct patient, procedure, equipment, support staff and site/side marked as required. Patient was prepped and draped in the usual sterile fashion.               Panda Hughes MD  Attending Surgeon    Sports Medicine Orthopaedic Surgery  [facility] [employer]

## 2024-11-12 DIAGNOSIS — E03.9 HYPOTHYROIDISM (ACQUIRED): ICD-10-CM

## 2024-11-12 RX ORDER — LEVOTHYROXINE SODIUM 100 UG/1
TABLET ORAL
Qty: 45 TABLET | Refills: 0 | Status: SHIPPED | OUTPATIENT
Start: 2024-11-12

## 2024-11-12 NOTE — TELEPHONE ENCOUNTER
Fax pharmacy for med refills on    Levothyroxine 100mcg    LOV: 08/26/24  NOV: 01/21/25    Jed Jean Baptiste

## 2024-11-16 RX ORDER — LIDOCAINE HYDROCHLORIDE 10 MG/ML
2 INJECTION, SOLUTION INFILTRATION; PERINEURAL
Status: COMPLETED | OUTPATIENT
Start: 2024-11-11 | End: 2024-11-11

## 2024-11-16 RX ORDER — TRIAMCINOLONE ACETONIDE 40 MG/ML
40 INJECTION, SUSPENSION INTRA-ARTICULAR; INTRAMUSCULAR
Status: COMPLETED | OUTPATIENT
Start: 2024-11-11 | End: 2024-11-11

## 2024-11-19 ENCOUNTER — PATIENT OUTREACH (OUTPATIENT)
Dept: PRIMARY CARE | Facility: CLINIC | Age: 89
End: 2024-11-19
Payer: MEDICARE

## 2024-11-22 ENCOUNTER — TREATMENT (OUTPATIENT)
Dept: PHYSICAL THERAPY | Facility: CLINIC | Age: 89
End: 2024-11-22
Payer: MEDICARE

## 2024-11-22 DIAGNOSIS — R42 VERTIGO: ICD-10-CM

## 2024-11-22 PROCEDURE — 97140 MANUAL THERAPY 1/> REGIONS: CPT | Mod: GP | Performed by: PHYSICAL THERAPIST

## 2024-11-22 NOTE — PROGRESS NOTES
Physical Therapy Treatment    Patient Name: Mic Saunders  MRN: [id number]  Today's Date: 11/22/2024  Visit 2 /12  Referred by: Murali Atwood   Insurance: APPROVED 12 VISITS, 11/11/24-2/3/25, AUTH #[id number]  Time Calculation  Start Time: 1010  Stop Time: 1036  Time Calculation (min): 26 min  1. Vertigo  Follow Up In Physical Therapy        Problem List Items Addressed This Visit             ICD-10-CM    Vertigo R42        PT Therapeutic Procedures Time Entry  Manual Therapy Time Entry: 25                   PRECAUTIONS: HTN, Emphysema, lymphedema RUE  Fall Risk: High    SUBJECTIVE:  Patient returns to clinic today for positional testing after unable to tolerate this on his last visit. He reports no complaints of vertigo over the last 2 weeks, but has avoided sleeping on his R side which has historically been the problem for him.  Pain level: N/A  Compliant with HEP? N/A    OBJECTIVE:  R sidly and L sidly test negative for vertigo provocation  Severe limitation of cervical mobility all planes, roughly 15 degrees of bilateral cervical rotation    TREATMENT:  - Manual Therapy: Assisted sidly tests to r/o positional vertigo, manual cervical STM to paracervical and UT's, SCM's, manual cervical traction, manual UT stretch and assisted cervical rotation stretches.    - Therapeutic Ex: Issued hep of R and L cervical rotation to be done 10 reps ea 2 x day    ASSESSMENT: Patient does not appear to have + testing for BPPV at this time, but difficult to fully assess due to limited trunk mobility and cervical ROM.    PLAN: Patient to test his ability to sidly on his R side over the next week. Continue to treat cervical restriction with manual work and mobility exercise.

## 2024-12-04 ENCOUNTER — TREATMENT (OUTPATIENT)
Dept: PHYSICAL THERAPY | Facility: CLINIC | Age: 89
End: 2024-12-04
Payer: MEDICARE

## 2024-12-04 DIAGNOSIS — R42 VERTIGO: ICD-10-CM

## 2024-12-04 PROCEDURE — 97110 THERAPEUTIC EXERCISES: CPT | Mod: GP | Performed by: PHYSICAL THERAPIST

## 2024-12-04 NOTE — PROGRESS NOTES
Physical Therapy Treatment and Discharge    Patient Name: Mic Saunders  MRN: [id number]  Today's Date: 12/4/2024  Visit 3 /12  Referred by: Murali Atwood   Insurance: APPROVED 12 VISITS, 11/11/24-2/3/25, AUTH #[id number]  Time Calculation  Start Time: 1355  Stop Time: 1430  Time Calculation (min): 35 min  1. Vertigo  Follow Up In Physical Therapy        Problem List Items Addressed This Visit             ICD-10-CM    Vertigo R42        PT Therapeutic Procedures Time Entry  Therapeutic Exercise Time Entry: 30                   PRECAUTIONS: HTN, Emphysema, lymphedema RUE   Fall Risk: High    SUBJECTIVE:  Patient reports no recent episode of vertigo and at times even awakes on his R side. He states he had a sore spot in his R UT the day after manual work last visit.States the lymphedema has decreased and seems to be under control.  Pain level: N/A  Compliant with HEP? N/A    OBJECTIVE: AROM cervical L rotation 40 degrees, R rotation 30 degrees; R SB 10 degrees, L SB 15 degrees; cervical flexion 50 degrees, cervical extension 36 degrees    Outcome Measure:  Other Measures  Dizziness Handicap Inventory: 8/100    TREATMENT:  Reassessment and re-administer DHI  - Therex:  Access Code: [email or web address]  URL: [email or web address]  Date: 12/04/2024  Prepared by: Karen Yanez    Exercises  - Standing Scapular Retraction with External Rotation  - 2 x daily - 7 x weekly - 2 sets - 10 reps - 3 hold  - Seated Cervical Rotation AROM  - 2 x daily - 7 x weekly - 1 sets - 10 reps - 3 sec hold  - Seated Cervical Sidebending AROM  - 2 x daily - 7 x weekly - 1 sets - 10 reps - 3 sec hold  - Seated Cervical Retraction  - 2 x daily - 7 x weekly - 1 sets - 10 reps - 3 sec hold    ASSESSMENT: Patient has met all goals and has a good understanding of his home exercises.    PLAN: DC PT.  Resolved       PT Problem       Patient will report vertigo at 0-1/10 with head and trunk movements (Met)       Start:  11/11/24     Expected End:  02/09/25    Resolved:  12/04/24         Patient will perform L sidly without fear of provocation of vertigo to restore bed mobility (Met)       Start:  11/11/24    Expected End:  02/09/25    Resolved:  12/04/24         Patient will score <10/100 on DHI (Met)       Start:  11/11/24    Expected End:  02/09/25    Resolved:  12/04/24

## 2024-12-11 ENCOUNTER — APPOINTMENT (OUTPATIENT)
Dept: PHYSICAL THERAPY | Facility: CLINIC | Age: 89
End: 2024-12-11
Payer: MEDICARE

## 2024-12-12 DIAGNOSIS — I10 BENIGN ESSENTIAL HTN: ICD-10-CM

## 2024-12-12 RX ORDER — LOSARTAN POTASSIUM 100 MG/1
100 TABLET ORAL DAILY
Qty: 90 TABLET | Refills: 1 | Status: SHIPPED | OUTPATIENT
Start: 2024-12-12

## 2024-12-12 NOTE — TELEPHONE ENCOUNTER
Fax pharmacy for med refills on    Losartan 100mg    LOV: 08/26/24  NOV: 01/21/25    Jed Jean Baptiste    
Spine appears normal, range of motion is not limited, no muscle or joint tenderness

## 2024-12-16 ENCOUNTER — APPOINTMENT (OUTPATIENT)
Dept: PHYSICAL THERAPY | Facility: CLINIC | Age: 89
End: 2024-12-16
Payer: MEDICARE

## 2024-12-18 ENCOUNTER — APPOINTMENT (OUTPATIENT)
Dept: OTOLARYNGOLOGY | Facility: CLINIC | Age: 89
End: 2024-12-18
Payer: MEDICARE

## 2024-12-18 DIAGNOSIS — R42 VERTIGO: Primary | ICD-10-CM

## 2024-12-18 PROCEDURE — 1036F TOBACCO NON-USER: CPT | Performed by: STUDENT IN AN ORGANIZED HEALTH CARE EDUCATION/TRAINING PROGRAM

## 2024-12-18 PROCEDURE — 1157F ADVNC CARE PLAN IN RCRD: CPT | Performed by: STUDENT IN AN ORGANIZED HEALTH CARE EDUCATION/TRAINING PROGRAM

## 2024-12-18 PROCEDURE — 99213 OFFICE O/P EST LOW 20 MIN: CPT | Performed by: STUDENT IN AN ORGANIZED HEALTH CARE EDUCATION/TRAINING PROGRAM

## 2024-12-18 PROCEDURE — 1126F AMNT PAIN NOTED NONE PRSNT: CPT | Performed by: STUDENT IN AN ORGANIZED HEALTH CARE EDUCATION/TRAINING PROGRAM

## 2024-12-18 PROCEDURE — 1159F MED LIST DOCD IN RCRD: CPT | Performed by: STUDENT IN AN ORGANIZED HEALTH CARE EDUCATION/TRAINING PROGRAM

## 2024-12-18 PROCEDURE — 1123F ACP DISCUSS/DSCN MKR DOCD: CPT | Performed by: STUDENT IN AN ORGANIZED HEALTH CARE EDUCATION/TRAINING PROGRAM

## 2024-12-18 ASSESSMENT — COLUMBIA-SUICIDE SEVERITY RATING SCALE - C-SSRS: 1. IN THE PAST MONTH, HAVE YOU WISHED YOU WERE DEAD OR WISHED YOU COULD GO TO SLEEP AND NOT WAKE UP?: NO

## 2024-12-18 ASSESSMENT — ENCOUNTER SYMPTOMS
DEPRESSION: 0
LOSS OF SENSATION IN FEET: 0
OCCASIONAL FEELINGS OF UNSTEADINESS: 0

## 2024-12-18 ASSESSMENT — PAIN SCALES - GENERAL: PAINLEVEL_OUTOF10: 0-NO PAIN

## 2024-12-18 NOTE — PROGRESS NOTES
HPI  12/18/24  The patient present for follow-up, vertigo resolved s/p PT.  Currently has no otologic complaints.  9/17/24  The patient present for follow-up, developed COVID-19 which he is currently recovering from.  Has not proceeded with physical therapy for canalith repositioning maneuvers.  Endorses brief episodes of vertigo while turning in bed lasting less than a minute.  Recall   Mic Saunders is a 89 y.o. male presenting for evaluation of hearing loss, vertigo and right otitis.  The patient has a long history of hearing loss for which she uses hearing aids bilaterally.  Reports developing an episode of right otitis associated with discomfort using his hearing aid.  He was evaluated in the ED and underwent treatment with antibiotic eardrops with significant improvement of his ear discomfort.  In addition the patient endorses brief vertiginous starting 6 weeks ago.  Episodes of vertigo last less than a minute and are triggered by turning in supine position.  Reports his vertigo has improved but not completely resolved.  Denies current ear pain, discharge from ear, tinnitus, aural fullness or autophony.   Denies history of prior ear surgery other than ear tube placement.     Past Medical History:   Diagnosis Date    Acute bronchitis due to other specified organisms 03/19/2019    Acute bacterial bronchitis    Anemia     Benign paroxysmal vertigo, unspecified ear 08/02/2019    Benign paroxysmal positional vertigo    BPH (benign prostatic hyperplasia) 02/13/2018    Cataract     Chronic fatigue, unspecified 03/10/2016    Chronic fatigue    Chronic rhinitis 05/31/2018    Rhinitis    COPD (chronic obstructive pulmonary disease) (Multi)     Cramp and spasm 05/05/2016    Muscle cramps at night    Dermatitis, unspecified 07/21/2013    Dermatitis, eczematoid    Disease of thyroid gland     Disorder of thyroid, unspecified     Thyroid trouble    Dizziness     Effusion, unspecified ankle 09/25/2018    Ankle edema     Elevated PSA     Emphysema of lung (Multi)     Encounter for preprocedural laboratory examination     Blood tests prior to treatment or procedure    Erythema intertrigo 07/21/2013    Intertrigo    Generalized hyperhidrosis 07/21/2013    Excessive sweating    GERD (gastroesophageal reflux disease)     Heart disease     HL (hearing loss)     Hypertension     Impacted cerumen, bilateral 08/05/2019    Excessive cerumen in both ear canals    Left lower quadrant pain 10/17/2014    Abdominal pain, LLQ (left lower quadrant)    Lymphedema, not elsewhere classified 03/09/2017    Lymphedema of arm    Malignant neoplasm of prostate (Multi)     Prostate cancer    Meniere's disease, 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    Meniere's disease    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    Narcolepsy    Neuromuscular disorder (Multi)     Other complications following immunization, not elsewhere classified, initial encounter 10/25/2016    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    Other conditions influencing health status 12/22/2015    History of cough    Other postherpetic nervous system involvement 07/21/2013    Postherpetic neuralgia    Other specified soft tissue disorders 05/08/2019    Other disorders of soft tissue    Personal history of diseases of the skin and subcutaneous tissue 04/13/2017    History of actinic keratosis    Personal history of malignant melanoma of skin 10/25/2016    History of malignant melanoma    Personal history of malignant neoplasm of prostate     History of malignant neoplasm of prostate    Personal history of other diseases of the circulatory system     History of hypertension    Personal history of other diseases of the digestive system 03/10/2016    History of rectal bleeding    Personal history of other diseases of the respiratory system 08/04/2014    History of upper respiratory infection    Personal history of other diseases of the respiratory system 09/14/2015    History of sinusitis     Personal history of other diseases of the respiratory system 12/07/2015    History of bronchitis    Personal history of other specified conditions 10/17/2018    History of dysarthria    Personal history of other specified conditions 02/27/2015    History of short term memory loss    Personal history of other specified conditions 03/09/2017    History of persistent cough    Personal history of other specified conditions 09/26/2017    History of chronic fatigue    Personal history of other specified conditions 12/22/2015    History of hemoptysis    Pressure ulcer of unspecified buttock, unspecified stage     Pressure sore on buttocks    Prostate cancer (Multi)     Sciatica, left side 10/18/2016    Sciatica of left side    Sleep apnea     Stroke (Multi)     Tachypnea, not elsewhere classified 05/05/2016    Tachypnea    Urinary incontinence     Viral wart, unspecified 07/21/2013    Warts    Visual impairment        Past Surgical History:   Procedure Laterality Date    EYE SURGERY      OTHER SURGICAL HISTORY  05/15/2014    Biopsy Nasal Cavity    OTHER SURGICAL HISTORY  03/10/2014    Skin Tag Removal    PROSTATE SURGERY  03/10/2014    Prostate Surgery    TONSILLECTOMY  05/15/2014    Tonsillectomy    VASECTOMY           Current Outpatient Medications on File Prior to Visit   Medication Sig Dispense Refill    albuterol 90 mcg/actuation inhaler Inhale 2 puffs every 4 hours if needed.      aspirin 81 mg EC tablet Take 1 tablet (81 mg) by mouth once daily. 90 tablet 1    atorvastatin (Lipitor) 20 mg tablet Take 1 tablet (20 mg) by mouth once daily. 90 tablet 1    azelastine (Astelin) 137 mcg (0.1 %) nasal spray SPRAY ONE TIME IN EACH NOSTRIL TWICE DAILY      clopidogrel (Plavix) 75 mg tablet Take 1 tablet (75 mg) by mouth once daily. 90 tablet 1    fluticasone (Flonase) 50 mcg/actuation nasal spray Administer into affected nostril(s) once every 24 hours.      ipratropium-albuteroL (Duo-Neb) 0.5-2.5 mg/3 mL nebulizer solution  USE 3 ML VIA NEBULIZER EVERY 6 HOURS AS NEEDED      levothyroxine (Synthroid, Levoxyl) 100 mcg tablet Take one tablet (100 mcg) by mouth every other day. 45 tablet 0    losartan (Cozaar) 100 mg tablet Take 1 tablet (100 mg) by mouth once daily. 90 tablet 1    pantoprazole (ProtoNix) 40 mg EC tablet Take 1 tablet (40 mg) by mouth once daily. 90 tablet 1    Paxlovid 300 mg (150 mg x 2)-100 mg tablet therapy pack TK 2 NIRMATRELVIR TS AND 1 RITONAVIR T TOGETHER PO TWICE DAILY FOR 5 DAYS      Trelegy Ellipta 100-62.5-25 mcg blister with device Inhale 1 puff once daily.      [DISCONTINUED] losartan (Cozaar) 100 mg tablet Take 1 tablet (100 mg) by mouth once daily. 90 tablet 1     No current facility-administered medications on file prior to visit.        Allergies   Allergen Reactions    Shellfish Derived Headache and Shortness of breath    Metoprolol Dizziness    Sparks Other        Review of Systems  A detailed 12 point ROS was performed and is negative except as noted in the intake form, HPI and/or Past Medical History        Physical Exam   CONSTITUTIONAL: Well developed, well nourished.  VOICE: Normal voice quality  RESPIRATION: Breathing comfortably, no stridor.  CV: No clubbing/cyanosis/edema in hands.  EYES: EOM Intact, sclera normal.  NEURO: Alert and oriented times 3, Cranial nerves V,VII intact and symmetric bilaterally.  HEAD AND FACE: Symmetric facial features, no masses or lesions, sinuses nontender to palpation.  SALIVARY GLANDS: Parotid and submandibular glands normal bilaterally.  + EARS: Normal external ears  Right EAC patent, tympanic membrane intact  Left EAC patent, tympanic membrane intact, retracted, sclerotic   NOSE: External nose midline, anterior rhinoscopy is normal with limited visualization to the anterior aspect of the interior turbinates. No lesions noted.  ORAL CAVITY/OROPHARYNX/LIPS: Normal mucous membranes, normal floor of mouth/tongue/OP, no masses or lesions are noted.  PHARYNGEAL WALLS  AND NASOPHARYNX: No masses noted. Mucosa appears clean and moist  NECK/LYMPH: No LAD, no thyroid masses. Trachea palpably midline  SKIN: Neck skin is without injury  PSYCH: Alert and oriented with appropriate mood and affect           Assessment  Vertigo  Asymmetric sensorineural hearing loss    Plan  Vertigo resolved s/p vestibular therapy   Audiogram 8/2/24 notable for bilateral sensorineural hearing loss with low-frequency bone line asymmetry and poor speech discrimination score in both ears.  Multiple diagnostic alternatives discussed, he will like to defer additional imaging/MRI.  He recently had a CT head which did not show intracranial abnormality or masses.   Treatment alternatives for SNHL discussed, he is not interested in surgical options, will like to continue hearing aid use.   We will monitor his hearing with yearly audiograms   RTC 1y or sooner as needed

## 2024-12-23 ENCOUNTER — APPOINTMENT (OUTPATIENT)
Dept: PHYSICAL THERAPY | Facility: CLINIC | Age: 89
End: 2024-12-23
Payer: MEDICARE

## 2024-12-30 ENCOUNTER — APPOINTMENT (OUTPATIENT)
Dept: PHYSICAL THERAPY | Facility: CLINIC | Age: 89
End: 2024-12-30
Payer: MEDICARE

## 2025-01-08 DIAGNOSIS — D64.9 ANEMIA, UNSPECIFIED TYPE: Primary | ICD-10-CM

## 2025-01-08 DIAGNOSIS — E03.9 HYPOTHYROIDISM (ACQUIRED): ICD-10-CM

## 2025-01-08 DIAGNOSIS — I10 BENIGN ESSENTIAL HTN: ICD-10-CM

## 2025-01-08 DIAGNOSIS — E78.5 HYPERLIPIDEMIA, UNSPECIFIED HYPERLIPIDEMIA TYPE: ICD-10-CM

## 2025-01-21 ENCOUNTER — APPOINTMENT (OUTPATIENT)
Dept: PRIMARY CARE | Facility: CLINIC | Age: OVER 89
End: 2025-01-21
Payer: MEDICARE

## 2025-01-21 VITALS
DIASTOLIC BLOOD PRESSURE: 70 MMHG | SYSTOLIC BLOOD PRESSURE: 144 MMHG | OXYGEN SATURATION: 94 % | WEIGHT: 229.2 LBS | BODY MASS INDEX: 35.9 KG/M2 | TEMPERATURE: 98 F | HEART RATE: 80 BPM

## 2025-01-21 DIAGNOSIS — E87.1 HYPONATREMIA: ICD-10-CM

## 2025-01-21 DIAGNOSIS — I44.2 COMPLETE HEART BLOCK: ICD-10-CM

## 2025-01-21 DIAGNOSIS — K21.9 GASTROESOPHAGEAL REFLUX DISEASE WITHOUT ESOPHAGITIS: ICD-10-CM

## 2025-01-21 DIAGNOSIS — E78.5 HYPERLIPIDEMIA, UNSPECIFIED HYPERLIPIDEMIA TYPE: ICD-10-CM

## 2025-01-21 DIAGNOSIS — I10 BENIGN ESSENTIAL HTN: ICD-10-CM

## 2025-01-21 DIAGNOSIS — I50.9 CONGESTIVE HEART FAILURE, UNSPECIFIED HF CHRONICITY, UNSPECIFIED HEART FAILURE TYPE: ICD-10-CM

## 2025-01-21 DIAGNOSIS — C61 MALIGNANT NEOPLASM OF PROSTATE (MULTI): ICD-10-CM

## 2025-01-21 DIAGNOSIS — Z00.00 MEDICARE ANNUAL WELLNESS VISIT, SUBSEQUENT: Primary | ICD-10-CM

## 2025-01-21 DIAGNOSIS — R35.0 URINARY FREQUENCY: ICD-10-CM

## 2025-01-21 DIAGNOSIS — D64.9 ANEMIA, UNSPECIFIED TYPE: ICD-10-CM

## 2025-01-21 DIAGNOSIS — E03.9 HYPOTHYROIDISM (ACQUIRED): ICD-10-CM

## 2025-01-21 DIAGNOSIS — E66.01 OBESITY, MORBID (MULTI): ICD-10-CM

## 2025-01-21 PROCEDURE — 1159F MED LIST DOCD IN RCRD: CPT | Performed by: NURSE PRACTITIONER

## 2025-01-21 PROCEDURE — 1158F ADVNC CARE PLAN TLK DOCD: CPT | Performed by: NURSE PRACTITIONER

## 2025-01-21 PROCEDURE — 3078F DIAST BP <80 MM HG: CPT | Performed by: NURSE PRACTITIONER

## 2025-01-21 PROCEDURE — 1160F RVW MEDS BY RX/DR IN RCRD: CPT | Performed by: NURSE PRACTITIONER

## 2025-01-21 PROCEDURE — G0439 PPPS, SUBSEQ VISIT: HCPCS | Performed by: NURSE PRACTITIONER

## 2025-01-21 PROCEDURE — 99214 OFFICE O/P EST MOD 30 MIN: CPT | Performed by: NURSE PRACTITIONER

## 2025-01-21 PROCEDURE — 1170F FXNL STATUS ASSESSED: CPT | Performed by: NURSE PRACTITIONER

## 2025-01-21 PROCEDURE — 1036F TOBACCO NON-USER: CPT | Performed by: NURSE PRACTITIONER

## 2025-01-21 PROCEDURE — 1123F ACP DISCUSS/DSCN MKR DOCD: CPT | Performed by: NURSE PRACTITIONER

## 2025-01-21 PROCEDURE — 3077F SYST BP >= 140 MM HG: CPT | Performed by: NURSE PRACTITIONER

## 2025-01-21 PROCEDURE — 1157F ADVNC CARE PLAN IN RCRD: CPT | Performed by: NURSE PRACTITIONER

## 2025-01-21 ASSESSMENT — PATIENT HEALTH QUESTIONNAIRE - PHQ9
2. FEELING DOWN, DEPRESSED OR HOPELESS: NOT AT ALL
SUM OF ALL RESPONSES TO PHQ9 QUESTIONS 1 AND 2: 0
1. LITTLE INTEREST OR PLEASURE IN DOING THINGS: NOT AT ALL

## 2025-01-21 ASSESSMENT — ENCOUNTER SYMPTOMS
DIARRHEA: 0
NUMBNESS: 1
WEAKNESS: 0
WHEEZING: 1
NAUSEA: 0
SHORTNESS OF BREATH: 1
FATIGUE: 1
CHILLS: 0
DIZZINESS: 0
ABDOMINAL PAIN: 0
HEADACHES: 0
VOMITING: 0
CHEST TIGHTNESS: 0
FEVER: 0
PALPITATIONS: 0
COUGH: 0

## 2025-01-21 ASSESSMENT — ACTIVITIES OF DAILY LIVING (ADL)
DOING_HOUSEWORK: TOTAL CARE
GROCERY_SHOPPING: TOTAL CARE
BATHING: INDEPENDENT
DRESSING: NEEDS ASSISTANCE
MANAGING_FINANCES: TOTAL CARE
TAKING_MEDICATION: TOTAL CARE

## 2025-01-21 NOTE — PROGRESS NOTES
Subjective   Reason for Visit: Mic Saunders is an 89 y.o. male here for a Medicare Wellness visit.     Past Medical, Surgical, and Family History reviewed and updated in chart.    Reviewed all medications by prescribing practitioner or clinical pharmacist (such as prescriptions, OTCs, herbal therapies and supplements) and documented in the medical record.    HPI  He presents to the office today for AWV and follow up. His son is present with him today. He reports he is not as steady on his feet, sleeps all day and is no longer able to help around the house.   He has been taking Benadryl every night.  He is tolerating medications well at current dose- no side effects. No chest pain, palpitations or edema.  (+) chronic SOB at baseline  (+) wheezing on exertion for the past year   No cough  No headaches, weakness or vision changes.  (+) chronic numbness &  tingling along with pain from prior GBS  No dizziness.  No abdominal pain, nausea, vomiting or diarrhea.  (+) urinary urgency.  He is wearing his CPAP at night and when sleeping  Home blood pressure readings: No recent home BP readings  Last eye exam: 9/2024  Diet:not as good lately  Exercise: limited  Weight: up about 9 lbs    Lymphedema of the right arm has increased- not using his machine or wearing his sleeve. He does not elevate the arm during the day.  Has seen lymphedema PT- he is really not interested in going back at this time.    Last labs: ordered and pending    He has not seen cardiology Dr. Bloom since pacemaker placement in 1/2023- son will set up appt.  Pulmonology: follows with Dr. Worthington every 6 months  ENT: Dr. Crystal Atwood as needed.  Orthopedics: Dr. Hughes for chronic knee pain    Patient Care Team:  JAKY Luu as PCP - General (Family Medicine)  JAKY Luu as PCP - [employer] PCP     Review of Systems   Constitutional:  Positive for fatigue. Negative for chills and fever.   Eyes:  Negative for  visual disturbance.   Respiratory:  Positive for shortness of breath and wheezing. Negative for cough and chest tightness.    Cardiovascular:  Positive for leg swelling. Negative for chest pain and palpitations.   Gastrointestinal:  Negative for abdominal pain, diarrhea, nausea and vomiting.   Neurological:  Positive for numbness. Negative for dizziness, weakness and headaches.       Objective   Vitals:  /70   Pulse 80   Temp 36.7 °C (98 °F) (Temporal)   Wt 104 kg (229 lb 3.2 oz)   SpO2 94%   BMI 35.90 kg/m²       Physical Exam  Constitutional:       General: He is not in acute distress.     Appearance: Normal appearance. He is not toxic-appearing.   Eyes:      Extraocular Movements: Extraocular movements intact.      Conjunctiva/sclera: Conjunctivae normal.      Pupils: Pupils are equal, round, and reactive to light.   Cardiovascular:      Rate and Rhythm: Normal rate and regular rhythm.      Pulses: Normal pulses.      Heart sounds: Normal heart sounds, S1 normal and S2 normal. No murmur heard.     Comments: 2+ pitting edema to RUE (chronic)  Pulmonary:      Effort: Pulmonary effort is normal. No respiratory distress.      Breath sounds: Normal breath sounds.   Abdominal:      General: Bowel sounds are normal.      Palpations: Abdomen is soft.      Tenderness: There is no abdominal tenderness.   Musculoskeletal:      Right lower le+ Pitting Edema present.      Left lower le+ Pitting Edema present.   Lymphadenopathy:      Cervical: No cervical adenopathy.   Neurological:      Mental Status: He is alert and oriented to person, place, and time.   Psychiatric:         Attention and Perception: Attention normal.         Mood and Affect: Mood and affect normal.         Behavior: Behavior normal. Behavior is cooperative.         Thought Content: Thought content normal.         Cognition and Memory: Cognition normal.         Judgment: Judgment normal.         Assessment & Plan  Hyperlipidemia,  unspecified hyperlipidemia type  Check updated FLP. Contnue Atorvastatin.       Benign essential HTN  Well-controlled on the current dose of Losartan.  Continue.       Congestive heart failure, unspecified HF chronicity, unspecified heart failure type  He has some increased swelling to bilateral lower extremities.  Otherwise remains fairly stable.  Strongly encouraged to follow-up with cardiology.       Hypothyroidism (acquired)  Continue the current dose of levothyroxine.  Check updated TSH.       Hyponatremia  Check updated BMP.       Obesity, morbid (Multi)  Discussed focusing on a healthy diet.       Gastroesophageal reflux disease without esophagitis  Stable on the current dose of Pantoprazole.  Continue.       Urinary frequency  Referral to urology for further evaluation.  Orders:    Referral to Urology; Future    Medicare annual wellness visit, subsequent  Up-to-date on age-appropriate screenings.  Declines vaccines given prior GBS history.       Anemia, unspecified type  Check updated CBC.       Complete heart block  Status post permanent pacemaker placement.  Needs a follow-up with cardiology.       Malignant neoplasm of prostate (Multi)  This was several years ago.  Referral placed to urology given urinary frequency.       We also discussed today that he should stop taking Benadryl.  This can cause increased sleepiness, unsteady gait, and an increase in urinary symptoms.

## 2025-01-21 NOTE — PATIENT INSTRUCTIONS
Elevated the right arm and both legs.  Wear lymphedema sleeve.  Obtain fasting labs as ordered.  Follow up with cardiology.

## 2025-01-21 NOTE — ASSESSMENT & PLAN NOTE
He has some increased swelling to bilateral lower extremities.  Otherwise remains fairly stable.  Strongly encouraged to follow-up with cardiology.

## 2025-01-23 ENCOUNTER — LAB (OUTPATIENT)
Dept: LAB | Facility: LAB | Age: OVER 89
End: 2025-01-23
Payer: MEDICARE

## 2025-01-23 DIAGNOSIS — E03.9 HYPOTHYROIDISM (ACQUIRED): ICD-10-CM

## 2025-01-23 DIAGNOSIS — E78.5 HYPERLIPIDEMIA, UNSPECIFIED HYPERLIPIDEMIA TYPE: ICD-10-CM

## 2025-01-23 DIAGNOSIS — E87.1 HYPONATREMIA: ICD-10-CM

## 2025-01-23 DIAGNOSIS — I10 BENIGN ESSENTIAL HTN: ICD-10-CM

## 2025-01-23 DIAGNOSIS — D64.9 ANEMIA, UNSPECIFIED TYPE: ICD-10-CM

## 2025-01-23 LAB
ALBUMIN SERPL BCP-MCNC: 4 G/DL (ref 3.4–5)
ALP SERPL-CCNC: 102 U/L (ref 33–136)
ALT SERPL W P-5'-P-CCNC: 22 U/L (ref 10–52)
ANION GAP SERPL CALC-SCNC: 12 MMOL/L (ref 10–20)
AST SERPL W P-5'-P-CCNC: 17 U/L (ref 9–39)
BASOPHILS # BLD AUTO: 0.06 X10*3/UL (ref 0–0.1)
BASOPHILS NFR BLD AUTO: 0.8 %
BILIRUB SERPL-MCNC: 0.5 MG/DL (ref 0–1.2)
BUN SERPL-MCNC: 15 MG/DL (ref 6–23)
CALCIUM SERPL-MCNC: 9.7 MG/DL (ref 8.6–10.3)
CHLORIDE SERPL-SCNC: 99 MMOL/L (ref 98–107)
CHOLEST SERPL-MCNC: 280 MG/DL (ref 0–199)
CHOLESTEROL/HDL RATIO: 5.1
CO2 SERPL-SCNC: 25 MMOL/L (ref 21–32)
CREAT SERPL-MCNC: 1.18 MG/DL (ref 0.5–1.3)
EGFRCR SERPLBLD CKD-EPI 2021: 59 ML/MIN/1.73M*2
EOSINOPHIL # BLD AUTO: 0.14 X10*3/UL (ref 0–0.4)
EOSINOPHIL NFR BLD AUTO: 1.8 %
ERYTHROCYTE [DISTWIDTH] IN BLOOD BY AUTOMATED COUNT: 15.3 % (ref 11.5–14.5)
FERRITIN SERPL-MCNC: 63 NG/ML (ref 20–300)
GLUCOSE SERPL-MCNC: 115 MG/DL (ref 74–99)
HCT VFR BLD AUTO: 46.7 % (ref 41–52)
HDLC SERPL-MCNC: 55.1 MG/DL
HGB BLD-MCNC: 15.5 G/DL (ref 13.5–17.5)
IMM GRANULOCYTES # BLD AUTO: 0.12 X10*3/UL (ref 0–0.5)
IMM GRANULOCYTES NFR BLD AUTO: 1.5 % (ref 0–0.9)
IRON SATN MFR SERPL: 26 % (ref 25–45)
IRON SERPL-MCNC: 96 UG/DL (ref 35–150)
LDLC SERPL CALC-MCNC: 186 MG/DL
LYMPHOCYTES # BLD AUTO: 2.5 X10*3/UL (ref 0.8–3)
LYMPHOCYTES NFR BLD AUTO: 31.7 %
MCH RBC QN AUTO: 31.4 PG (ref 26–34)
MCHC RBC AUTO-ENTMCNC: 33.2 G/DL (ref 32–36)
MCV RBC AUTO: 95 FL (ref 80–100)
MONOCYTES # BLD AUTO: 0.94 X10*3/UL (ref 0.05–0.8)
MONOCYTES NFR BLD AUTO: 11.9 %
NEUTROPHILS # BLD AUTO: 4.13 X10*3/UL (ref 1.6–5.5)
NEUTROPHILS NFR BLD AUTO: 52.3 %
NON HDL CHOLESTEROL: 225 MG/DL (ref 0–149)
NRBC BLD-RTO: 0 /100 WBCS (ref 0–0)
PLATELET # BLD AUTO: 197 X10*3/UL (ref 150–450)
POTASSIUM SERPL-SCNC: 4.4 MMOL/L (ref 3.5–5.3)
PROT SERPL-MCNC: 6.1 G/DL (ref 6.4–8.2)
RBC # BLD AUTO: 4.94 X10*6/UL (ref 4.5–5.9)
SODIUM SERPL-SCNC: 132 MMOL/L (ref 136–145)
T4 FREE SERPL-MCNC: 0.87 NG/DL (ref 0.61–1.12)
TIBC SERPL-MCNC: 370 UG/DL (ref 240–445)
TRIGL SERPL-MCNC: 196 MG/DL (ref 0–149)
TSH SERPL-ACNC: 6.47 MIU/L (ref 0.44–3.98)
UIBC SERPL-MCNC: 274 UG/DL (ref 110–370)
VLDL: 39 MG/DL (ref 0–40)
WBC # BLD AUTO: 7.9 X10*3/UL (ref 4.4–11.3)

## 2025-01-23 PROCEDURE — 83930 ASSAY OF BLOOD OSMOLALITY: CPT

## 2025-01-23 PROCEDURE — 85025 COMPLETE CBC W/AUTO DIFF WBC: CPT

## 2025-01-23 PROCEDURE — 83550 IRON BINDING TEST: CPT

## 2025-01-23 PROCEDURE — 80061 LIPID PANEL: CPT

## 2025-01-23 PROCEDURE — 84439 ASSAY OF FREE THYROXINE: CPT

## 2025-01-23 PROCEDURE — 83540 ASSAY OF IRON: CPT

## 2025-01-23 PROCEDURE — 82728 ASSAY OF FERRITIN: CPT

## 2025-01-23 PROCEDURE — 84443 ASSAY THYROID STIM HORMONE: CPT

## 2025-01-23 PROCEDURE — 80053 COMPREHEN METABOLIC PANEL: CPT

## 2025-01-26 DIAGNOSIS — E87.1 HYPONATREMIA: Primary | ICD-10-CM

## 2025-01-26 LAB — OSMOLALITY SERPL: 284 MOSM/KG (ref 280–300)

## 2025-01-27 DIAGNOSIS — E03.9 HYPOTHYROIDISM (ACQUIRED): ICD-10-CM

## 2025-01-27 DIAGNOSIS — Z86.73 H/O TIA (TRANSIENT ISCHEMIC ATTACK) AND STROKE: ICD-10-CM

## 2025-01-27 RX ORDER — LEVOTHYROXINE SODIUM 100 UG/1
TABLET ORAL
Qty: 45 TABLET | Refills: 0 | Status: SHIPPED | OUTPATIENT
Start: 2025-01-27

## 2025-01-27 RX ORDER — CLOPIDOGREL BISULFATE 75 MG/1
75 TABLET ORAL DAILY
Qty: 90 TABLET | Refills: 1 | Status: SHIPPED | OUTPATIENT
Start: 2025-01-27

## 2025-02-28 DIAGNOSIS — E03.9 HYPOTHYROIDISM (ACQUIRED): Primary | ICD-10-CM

## 2025-03-03 ENCOUNTER — APPOINTMENT (OUTPATIENT)
Dept: UROLOGY | Facility: CLINIC | Age: OVER 89
End: 2025-03-03
Payer: MEDICARE

## 2025-03-03 VITALS — HEART RATE: 82 BPM | DIASTOLIC BLOOD PRESSURE: 76 MMHG | SYSTOLIC BLOOD PRESSURE: 127 MMHG

## 2025-03-03 DIAGNOSIS — N39.41 URGE INCONTINENCE OF URINE: ICD-10-CM

## 2025-03-03 DIAGNOSIS — N39.0 URINARY TRACT INFECTION WITHOUT HEMATURIA, SITE UNSPECIFIED: ICD-10-CM

## 2025-03-03 DIAGNOSIS — R35.0 URINARY FREQUENCY: ICD-10-CM

## 2025-03-03 DIAGNOSIS — C61 PROSTATE CANCER (MULTI): Primary | ICD-10-CM

## 2025-03-03 DIAGNOSIS — R21 GROIN RASH: ICD-10-CM

## 2025-03-03 LAB
POC BILIRUBIN, URINE: NEGATIVE
POC BLOOD, URINE: NEGATIVE
POC GLUCOSE, URINE: NEGATIVE MG/DL
POC KETONES, URINE: NEGATIVE MG/DL
POC LEUKOCYTES, URINE: ABNORMAL
POC NITRITE,URINE: NEGATIVE
POC PH, URINE: 5.5 PH
POC PROTEIN, URINE: NEGATIVE MG/DL
POC SPECIFIC GRAVITY, URINE: 1.01
POC UROBILINOGEN, URINE: 0.2 EU/DL
PSA SERPL-MCNC: 0.05 NG/ML

## 2025-03-03 PROCEDURE — 3074F SYST BP LT 130 MM HG: CPT | Performed by: UROLOGY

## 2025-03-03 PROCEDURE — 81003 URINALYSIS AUTO W/O SCOPE: CPT | Performed by: UROLOGY

## 2025-03-03 PROCEDURE — 1123F ACP DISCUSS/DSCN MKR DOCD: CPT | Performed by: UROLOGY

## 2025-03-03 PROCEDURE — 51798 US URINE CAPACITY MEASURE: CPT | Performed by: UROLOGY

## 2025-03-03 PROCEDURE — 3078F DIAST BP <80 MM HG: CPT | Performed by: UROLOGY

## 2025-03-03 PROCEDURE — 1157F ADVNC CARE PLAN IN RCRD: CPT | Performed by: UROLOGY

## 2025-03-03 PROCEDURE — 99204 OFFICE O/P NEW MOD 45 MIN: CPT | Performed by: UROLOGY

## 2025-03-03 PROCEDURE — 1036F TOBACCO NON-USER: CPT | Performed by: UROLOGY

## 2025-03-03 PROCEDURE — 1159F MED LIST DOCD IN RCRD: CPT | Performed by: UROLOGY

## 2025-03-03 RX ORDER — CLOTRIMAZOLE AND BETAMETHASONE DIPROPIONATE 10; .64 MG/G; MG/G
1 CREAM TOPICAL 2 TIMES DAILY
Qty: 30 G | Refills: 1 | Status: SHIPPED | OUTPATIENT
Start: 2025-03-03 | End: 2025-03-31

## 2025-03-03 RX ORDER — TORSEMIDE 20 MG/1
20 TABLET ORAL DAILY
COMMUNITY

## 2025-03-03 RX ORDER — OXYBUTYNIN CHLORIDE 5 MG/1
5 TABLET ORAL 2 TIMES DAILY
Qty: 60 TABLET | Refills: 0 | Status: SHIPPED | OUTPATIENT
Start: 2025-03-03 | End: 2026-03-03

## 2025-03-03 NOTE — PATIENT INSTRUCTIONS
Impression  Urinary incontinence  Prostate cancer  Radical prostatectomy 2006  Groin rash    Plan  Timed voiding  Oxybutynin 5 mg twice a day for 30-day trial  Lotrisone cream trial   Urine culture   PSA check  Appointment in a year

## 2025-03-03 NOTE — PROGRESS NOTES
03/03/2025  89-year-old gentleman presents urinary incontinence ever since a radical prostatectomy in 2006.  Pad use occasionally.    Patient has no nausea, no vomiting, no fever.    Exam: Uncircumcised, normal penis normal testes    PVR: 35 cc    We discussed prostate cancer status post radical prostatectomy  We discussed urinary incontinence  We discussed anticholinergic medication trial oxybutynin  We discussed PSA check  All the questions were answered, the patient expressed understanding and agreed to the plan.    Impression  Urinary incontinence  Prostate cancer  Radical prostatectomy 2006  Groin rash    Plan  Timed voiding  Oxybutynin 5 mg twice a day for 30-day trial  Lotrisone cream trial   Urine culture   PSA check  Appointment in a year  Appointment in 30 days      Chief Complaint   Patient presents with    Urinary Frequency     Pt is here today for a new pt visit for urinary frequency referred by PCP, Dr Vila. He states that he is unable to hold his urine. He has a history of prostate cancer.         Physical Exam     TODAYS LAB RESULTS:  ontains abnormal data POCT UA Automated manually resulted  Order: [id number]   Status: Final result       Visible to patient: Yes (not seen)       Next appt: 07/30/2025 at 01:00 PM in Primary Care (Linda Vila, APRN-CNP)       Dx: Urinary frequency    0 Result Notes       Component  Ref Range & Units 11:18 9 mo ago   POC Glucose, Urine  NEGATIVE mg/dl NEGATIVE NEGATIVE   POC Bilirubin, Urine  NEGATIVE NEGATIVE NEGATIVE   POC Ketones, Urine  NEGATIVE mg/dl NEGATIVE NEGATIVE   POC Specific Gravity, Urine  1.005 - 1.035 1.010 1.015   POC Blood, Urine  NEGATIVE NEGATIVE NEGATIVE   POC PH, Urine  No Reference Range Established PH 5.5 6.0   POC Protein, Urine  NEGATIVE mg/dl NEGATIVE NEGATIVE R   POC Urobilinogen, Urine  0.2, 1.0 EU/DL 0.2 0.2   Poc Nitrite, Urine  NEGATIVE NEGATIVE NEGATIVE   POC Leukocytes, Urine  NEGATIVE SMALL (1+) Abnormal  NEGATIVE              Specimen Collected: 03/03/25 11:18 Last Resulted: 03/03/25 11:18       ntains abnormal data Lipid Panel  Order: [id number]   Status: Final result       Visible to patient: Yes (not seen)       Dx: Hyperlipidemia, unspecified hyperlipi...    0 Result Notes       1 Patient Communication       1  Topic            Component  Ref Range & Units 1 mo ago  (1/23/25) 1 yr ago  (2/5/24) 2 yr ago  (1/24/23) 4 yr ago  (1/27/21) 4 yr ago  (5/7/20) 5 yr ago  (5/8/19) 5 yr ago  (4/3/19)   Cholesterol  0 - 199 mg/dL 280 High  160    High    CM   Comment:      Age      Desirable   Borderline High   High     0-19 Y     0 - 169       170 - 199     >/= 200    20-24 Y     0 - 189       190 - 224     >/= 225        >24 Y     0 - 199       200 - 239     >/= 240   **All ranges are based on fasting samples. Specific   therapeutic targets will vary based on patient-specific   cardiac risk.    Pediatric guidelines reference:Pediatrics 2011, 128(S5).Adult guidelines reference: NCEP ATPIII Guidelines,KATY 2001, 258:2486-97    Venipuncture immediately after or during the administration of Metamizole may lead to falsely low results. Testing should be performed immediately prior to Metamizole dosing.   HDL-Cholesterol  mg/dL 55.1 51.8 CM 45.4 CM 57.1 CM 48.7 CM 51.6 CM 45.6 CM   Comment:  Age       Very Low   Low     Normal    High    0-19 Y    < 35      < 40     40-45     ----  20-24 Y    ----     < 40      >45      ----        >24 Y      ----     < 40     40-60      >60   Cholesterol/HDL Ratio 5.1 3.1 CM 3.2 CM 3.0 CM 5.3 Abnormal  CM 3.4 CM 4.0 CM   Comment:  Ref Values  Desirable  < 3.4  High Risk  > 5.0   LDL Calculated  <=99 mg/dL 186 High  79 CM 76 R, CM 88 R,  High  R, CM 82 R,  High  R, CM   Comment:                            Near   Borderline      AGE      Desirable  Optimal    High     High     Very High     0-19 Y     0 - 109     ---    110-129   >/= 130     ----    20-24 Y     0 - 119      ---    120-159   >/= 160     ----      >24 Y     0 -  99   100-129  130-159   160-189     >/=190   VLDL  0 - 40 mg/dL 39 29 23 28 69 High  41 High  35   Triglycerides  0 - 149 mg/dL 196 High  147    High   High   High  CM   Comment: Age              Desirable        Borderline         High        Very High  SEX:B           mg/dL             mg/dL               mg/dL      mg/dL  <=14D                       ----               ----        ----  15D-365D                    ----               ----        ----  1Y-9Y           0-74               75-99             >=100       ----  10Y-19Y        0-89                            >=130       ----  20Y-24Y        0-114             115-149             >=150      ----  >= 25Y         0-149             150-199             200-499    >=500      Venipuncture immediately after or during the administration of Metamizole may lead to falsely low results. Testing should be performed immediately prior to Metamizole dosing.   Non HDL Cholesterol  0 - 149 mg/dL 225 High  108 CM   207 R,  R, CM    Comment:      Age       Desirable   Borderline High   High     Very High     0-19 Y     0 - 119       120 - 144     >/= 145    >/= 160    20-24 Y     0 - 149       150 - 189     >/= 190      ----        >24 Y    30 mg/dL above LDL Cholesterol goal   Resulting Agency [facility]             Specimen Collected: 01/23/25 08:37 Last Resulted: 01/23/25 16:38     TSH with reflex to Free T4 if abnormal  Order: [id number]   Status: Final result       Visible to patient: Yes (not seen)       Dx: Hypothyroidism (acquired)    0 Result Notes       1 Patient Communication       1  Topic            Component  Ref Range & Units 1 mo ago  (1/23/25) 1 yr ago  (10/18/23) 2 yr ago  (1/24/23) 3 yr ago  (9/13/21) 4 yr ago  (1/27/21) 4 yr ago  (5/7/20) 5 yr ago  (5/8/19)   Thyroid Stimulating Hormone  0.44 - 3.98  mIU/L 6.47 High  3.03 4.19 High  CM 0.48 CM 0.47 CM 1.61 CM 0.93 CM   Resulting Agency [facility]              Narrative  Performed by: [facility]  TSH testing is performed using different testing methodology at [facility] than at other [facility]. Direct result comparisons should only be made within the same method.   Specimen Collected: 01/23/25 08:37 Last Resulted: 01/23/25 12:18     ntains abnormal data CBC and Auto Differential  Order: [id number]   Status: Final result       Visible to patient: Yes (not seen)       Dx: Anemia, unspecified type    0 Result Notes       1 Patient Communication            Component  Ref Range & Units 1 mo ago  (1/23/25) 8 mo ago  (7/5/24) 8 mo ago  (6/24/24) 8 mo ago  (6/19/24) 1 yr ago  (2/5/24) 1 yr ago  (10/18/23) 2 yr ago  (1/24/23)   WBC  4.4 - 11.3 x10*3/uL 7.9 8.3 9.8 10.0 8.2 9.6 11.4 High  R   nRBC  0.0 - 0.0 /100 WBCs 0.0 0.0 0.2 High  0.0 0.0 0.0    RBC  4.50 - 5.90 x10*6/uL 4.94 5.11 5.00 4.77 4.96 4.86 4.28 Low  R   Hemoglobin  13.5 - 17.5 g/dL 15.5 11.3 Low  11.1 Low  10.7 Low  11.5 Low  12.5 Low  9.2 Low    Hematocrit  41.0 - 52.0 % 46.7 37.9 Low  36.9 Low  34.7 Low  39.8 Low  40.1 Low  32.1 Low    MCV  80 - 100 fL 95 74 Low  74 Low  73 Low  80 83 75 Low    MCH  26.0 - 34.0 pg 31.4 22.1 Low  22.2 Low  22.4 Low  23.2 Low  25.7 Low     MCHC  32.0 - 36.0 g/dL 33.2 29.8 Low  30.1 Low  30.8 Low  28.9 Low  31.2 Low  28.7 Low    RDW  11.5 - 14.5 % 15.3 High  21.3 High  20.5 High  20.0 High  17.4 High  17.0 High  20.1 High    Platelets  150 - 450 x10*3/uL 197 317 355 305 269 267 399 R   Neutrophils %  40.0 - 80.0 % 52.3   66.1      Immature Granulocytes %, Automated  0.0 - 0.9 % 1.5 High    1.3 High  CM      Comment: Immature Granulocyte Count (IG) includes promyelocytes, myelocytes and metamyelocytes but does not include bands. Percent differential counts (%) should be interpreted in the context of the absolute  cell counts (cells/UL).   Lymphocytes %  13.0 - 44.0 % 31.7   20.7      Monocytes %  2.0 - 10.0 % 11.9   10.5      Eosinophils %  0.0 - 6.0 % 1.8   0.9      Basophils %  0.0 - 2.0 % 0.8   0.5      Neutrophils Absolute  1.60 - 5.50 x10*3/uL 4.13   6.61 High  CM      Comment: Percent differential counts (%) should be interpreted in the context of the absolute cell counts (cells/uL).   Immature Granulocytes Absolute, Automated  0.00 - 0.50 x10*3/uL 0.12   0.13      Lymphocytes Absolute  0.80 - 3.00 x10*3/uL 2.50   2.07      Monocytes Absolute  0.05 - 0.80 x10*3/uL 0.94 High    1.05 High       Eosinophils Absolute  0.00 - 0.40 x10*3/uL 0.14   0.09      Basophils Absolute  0.00 - 0.10 x10*3/uL 0.06   0.05      MPV      11.3 R    Resulting Agency [facility]             Specimen Collected: 01/23/25 08:37 Last Resulted: 01/23/25 11:08     ntains abnormal data Comprehensive Metabolic Panel  Order: [id number]   Status: Final result       Visible to patient: Yes (seen)       Dx: Benign essential HTN    0 Result Notes       1 Patient Communication       2  Topics            Component  Ref Range & Units 1 mo ago  (1/23/25) 8 mo ago  (7/5/24) 8 mo ago  (6/24/24) 8 mo ago  (6/19/24) 1 yr ago  (2/5/24) 1 yr ago  (10/18/23) 2 yr ago  (1/24/23)   Glucose  74 - 99 mg/dL 115 High  112 High  107 High  134 High  111 High  112 High  83   Sodium  136 - 145 mmol/L 132 Low  130 Low  129 Low  128 Low  135 Low  132 Low  137   Potassium  3.5 - 5.3 mmol/L 4.4 4.8 4.7 4.6 4.6 4.3 4.7   Chloride  98 - 107 mmol/L 99 99 98 98 102 102 102   Bicarbonate  21 - 32 mmol/L 25 23 22 22 26 22 23   Anion Gap  10 - 20 mmol/L 12 13 14 13 12 12 17   Urea Nitrogen  6 - 23 mg/dL 15 14 15 21 17 20 19   Creatinine  0.50 - 1.30 mg/dL 1.18 1.10 1.12 1.05 1.26 1.08 1.29   eGFR  >60 mL/min/1.73m*2 59 Low  64 CM 63 CM 68 CM 55 Low  CM 66 CM    Comment: Calculations of estimated GFR are performed using the 2021 CKD-EPI  Study Refit equation without the race variable for the IDMS-Traceable creatinine methods.  https://jasn.asnjournals.org/content/early/2021/09/22/ASN.5622160005   Calcium  8.6 - 10.3 mg/dL 9.7 9.5 9.1 9.0 9.2 9.3 9.3   Albumin  3.4 - 5.0 g/dL 4.0      3.8   Alkaline Phosphatase  33 - 136 U/L 102      92   Total Protein  6.4 - 8.2 g/dL 6.1 Low       6.5   AST  9 - 39 U/L 17      15   Bilirubin, Total  0.0 - 1.2 mg/dL 0.5      0.5   ALT  10 - 52 U/L 22      17 CM   Comment: Patients treated with Sulfasalazine may generate falsely decreased results for ALT.   [facility]             Specimen Collected: 01/23/25 08:37 Last Resulted: 01/23/25 16:38       ASSESSMENT&PLAN:      IMPRESSIONS:    Surgery  2006 [facility]

## 2025-03-04 LAB
T4 FREE SERPL-MCNC: 1.3 NG/DL (ref 0.8–1.8)
TSH SERPL-ACNC: 6.36 MIU/L (ref 0.4–4.5)

## 2025-03-05 ENCOUNTER — PATIENT MESSAGE (OUTPATIENT)
Dept: UROLOGY | Facility: CLINIC | Age: OVER 89
End: 2025-03-05
Payer: MEDICARE

## 2025-03-05 DIAGNOSIS — N39.0 URINARY TRACT INFECTION WITHOUT HEMATURIA, SITE UNSPECIFIED: Primary | ICD-10-CM

## 2025-03-05 RX ORDER — CIPROFLOXACIN 250 MG/1
250 TABLET, FILM COATED ORAL 2 TIMES DAILY
Qty: 14 TABLET | Refills: 0 | Status: SHIPPED | OUTPATIENT
Start: 2025-03-05 | End: 2025-03-07

## 2025-03-06 DIAGNOSIS — E03.9 HYPOTHYROIDISM (ACQUIRED): Primary | ICD-10-CM

## 2025-03-06 RX ORDER — CEPHALEXIN 500 MG/1
500 CAPSULE ORAL 4 TIMES DAILY
Qty: 40 CAPSULE | Refills: 0 | Status: SHIPPED | OUTPATIENT
Start: 2025-03-06 | End: 2025-03-16

## 2025-03-07 LAB — BACTERIA UR CULT: ABNORMAL

## 2025-03-07 RX ORDER — CIPROFLOXACIN 500 MG/1
500 TABLET ORAL 2 TIMES DAILY
Qty: 14 TABLET | Refills: 0 | Status: SHIPPED | OUTPATIENT
Start: 2025-03-07 | End: 2025-03-14

## 2025-04-09 DIAGNOSIS — E03.9 HYPOTHYROIDISM (ACQUIRED): ICD-10-CM

## 2025-04-09 RX ORDER — LEVOTHYROXINE SODIUM 100 UG/1
TABLET ORAL
Qty: 45 TABLET | Refills: 0 | Status: SHIPPED | OUTPATIENT
Start: 2025-04-09

## 2025-04-09 NOTE — TELEPHONE ENCOUNTER
Fax pharmacy for med refills on    Levothyroxine 100mcg    LOV: 01/21/25  NOV: 07/30/25    [facility]: [facility]

## 2025-04-21 DIAGNOSIS — E03.9 HYPOTHYROIDISM (ACQUIRED): ICD-10-CM

## 2025-04-24 LAB
T4 FREE SERPL-MCNC: 1.5 NG/DL (ref 0.8–1.8)
TSH SERPL-ACNC: 6.42 MIU/L (ref 0.4–4.5)

## 2025-05-02 ENCOUNTER — APPOINTMENT (OUTPATIENT)
Dept: PRIMARY CARE | Facility: CLINIC | Age: OVER 89
End: 2025-05-02
Payer: MEDICARE

## 2025-05-02 VITALS
OXYGEN SATURATION: 93 % | BODY MASS INDEX: 33.74 KG/M2 | SYSTOLIC BLOOD PRESSURE: 112 MMHG | DIASTOLIC BLOOD PRESSURE: 60 MMHG | HEART RATE: 72 BPM | WEIGHT: 215.4 LBS | TEMPERATURE: 97.9 F

## 2025-05-02 DIAGNOSIS — Z86.73 H/O TIA (TRANSIENT ISCHEMIC ATTACK) AND STROKE: ICD-10-CM

## 2025-05-02 DIAGNOSIS — E78.2 MIXED HYPERLIPIDEMIA: ICD-10-CM

## 2025-05-02 DIAGNOSIS — R47.01 APHASIA: ICD-10-CM

## 2025-05-02 PROCEDURE — 1160F RVW MEDS BY RX/DR IN RCRD: CPT | Performed by: NURSE PRACTITIONER

## 2025-05-02 PROCEDURE — 99213 OFFICE O/P EST LOW 20 MIN: CPT | Performed by: NURSE PRACTITIONER

## 2025-05-02 PROCEDURE — 3074F SYST BP LT 130 MM HG: CPT | Performed by: NURSE PRACTITIONER

## 2025-05-02 PROCEDURE — 3078F DIAST BP <80 MM HG: CPT | Performed by: NURSE PRACTITIONER

## 2025-05-02 PROCEDURE — 1159F MED LIST DOCD IN RCRD: CPT | Performed by: NURSE PRACTITIONER

## 2025-05-02 PROCEDURE — 1158F ADVNC CARE PLAN TLK DOCD: CPT | Performed by: NURSE PRACTITIONER

## 2025-05-02 PROCEDURE — 1123F ACP DISCUSS/DSCN MKR DOCD: CPT | Performed by: NURSE PRACTITIONER

## 2025-05-02 PROCEDURE — 1036F TOBACCO NON-USER: CPT | Performed by: NURSE PRACTITIONER

## 2025-05-02 PROCEDURE — 1157F ADVNC CARE PLAN IN RCRD: CPT | Performed by: NURSE PRACTITIONER

## 2025-05-02 RX ORDER — CLOTRIMAZOLE AND BETAMETHASONE DIPROPIONATE 10; .64 MG/G; MG/G
CREAM TOPICAL
COMMUNITY
Start: 2025-03-31

## 2025-05-02 RX ORDER — MONTELUKAST SODIUM 10 MG/1
TABLET ORAL
COMMUNITY
Start: 2025-04-30

## 2025-05-02 RX ORDER — AZELASTINE 1 MG/ML
1 SPRAY, METERED NASAL 2 TIMES DAILY
COMMUNITY
Start: 2025-02-03

## 2025-05-02 RX ORDER — SPIRONOLACTONE 25 MG/1
12.5 TABLET ORAL
COMMUNITY
Start: 2025-04-07

## 2025-05-02 RX ORDER — ATORVASTATIN CALCIUM 20 MG/1
20 TABLET, FILM COATED ORAL DAILY
Qty: 90 TABLET | Refills: 1 | Status: SHIPPED | OUTPATIENT
Start: 2025-05-02

## 2025-05-02 ASSESSMENT — ENCOUNTER SYMPTOMS
WEAKNESS: 1
WHEEZING: 0
CHILLS: 0
FATIGUE: 1
HEADACHES: 1
SHORTNESS OF BREATH: 1
CHEST TIGHTNESS: 0
SPEECH DIFFICULTY: 1
FEVER: 0
NUMBNESS: 0
COUGH: 0
DIZZINESS: 1
PALPITATIONS: 0

## 2025-05-02 NOTE — PROGRESS NOTES
Subjective   Mic Saunders is a 89 y.o. male who presents for Transient Ischemic Attack, Referral (For Neurology), and Follow-up.    Transient Ischemic Attack  Associated symptoms include fatigue, headaches and weakness. Pertinent negatives include no chest pain, chills, coughing, fever or numbness.     He presents to the office today along with his son to discuss speech issues.   He reports he automatically goes to say something and it comes out the wrong way.   He reports these episodes last about 20-40 minutes.  This was occurring every Wednesday or Thursday -3 occurrences total.  No slurred speech  Reports son checked on one occasion and BP was a little low 90/60's.  (+) mild headaches- no severe  No new numbness, tingling, or weakness.   (+) dizziness and balance is off.   No recent falls or head injury.  Has to get up slowly.  No vision changes.    Otherwise he is feeling well. No chest pain, palpitation. SOB has improved slightly since starting the diuretic. Edema has improved. Weight is down 14 lbs.     Son reports his memory is not great. He is forgetful- forgets people's names at times. Forgot he had the appt today.      Review of Systems   Constitutional:  Positive for fatigue. Negative for chills and fever.   Respiratory:  Positive for shortness of breath. Negative for cough, chest tightness and wheezing.    Cardiovascular:  Positive for leg swelling. Negative for chest pain and palpitations.   Neurological:  Positive for dizziness, speech difficulty, weakness and headaches. Negative for syncope and numbness.       Objective   /60   Pulse 72   Temp 36.6 °C (97.9 °F) (Temporal)   Wt 97.7 kg (215 lb 6.4 oz)   SpO2 93%   BMI 33.74 kg/m²     Physical Exam  Constitutional:       General: He is not in acute distress.     Appearance: Normal appearance. He is not toxic-appearing.   Eyes:      Extraocular Movements: Extraocular movements intact.      Conjunctiva/sclera: Conjunctivae normal.      Pupils:  Pupils are equal, round, and reactive to light.   Cardiovascular:      Rate and Rhythm: Normal rate and regular rhythm.      Pulses: Normal pulses.      Heart sounds: Normal heart sounds, S1 normal and S2 normal. No murmur heard.     Comments: (+) pitting edema below the knee  (+) pitting edema to the right upper extremity.  Pulmonary:      Effort: Pulmonary effort is normal. No respiratory distress.      Breath sounds: Normal breath sounds.   Abdominal:      General: Bowel sounds are normal.      Palpations: Abdomen is soft.      Tenderness: There is no abdominal tenderness.   Musculoskeletal:      Right lower le+ Edema present.      Left lower le+ Edema present.   Lymphadenopathy:      Cervical: No cervical adenopathy.   Neurological:      General: No focal deficit present.      Mental Status: He is alert and oriented to person, place, and time.      Comments: Speech is clear and fluent today- at baseline.  He has some right sided weakness (chronic)   Psychiatric:         Attention and Perception: Attention normal.         Mood and Affect: Mood and affect normal.         Behavior: Behavior normal. Behavior is cooperative.         Thought Content: Thought content normal.         Cognition and Memory: Cognition normal.         Judgment: Judgment normal.         Assessment/Plan   Problem List Items Addressed This Visit       H/O TIA (transient ischemic attack) and stroke    Relevant Medications    atorvastatin (Lipitor) 20 mg tablet    Hyperlipidemia    Relevant Medications    atorvastatin (Lipitor) 20 mg tablet     Other Visit Diagnoses         Aphasia              We had a long discussion today regarding his symptoms. We discussed that these episodes could be due to a TIA or also from BP dropping. He never wants to go back to the hospital. We discussed checking a CT of the head- he declines today.   We also discussed a referral to neurology- he declines.   Recommended continuing ASA and Plavix. Resume  Atorvastatin.   Discuss low BP and diuretics with cardiology.    It has been a pleasure seeing you today!

## 2025-05-02 NOTE — PATIENT INSTRUCTIONS
Continue medications at the current dose.  Restart Atorvastatin.  Reach out to cardiology regarding low blood pressure readings.  Follow up in July as planned.

## 2025-05-15 ENCOUNTER — PATIENT MESSAGE (OUTPATIENT)
Dept: UROLOGY | Facility: CLINIC | Age: OVER 89
End: 2025-05-15
Payer: MEDICARE

## 2025-05-15 DIAGNOSIS — R21 GROIN RASH: Primary | ICD-10-CM

## 2025-05-15 RX ORDER — CLOTRIMAZOLE AND BETAMETHASONE DIPROPIONATE 10; .64 MG/G; MG/G
1 CREAM TOPICAL 2 TIMES DAILY
Qty: 30 G | Refills: 1 | Status: SHIPPED | OUTPATIENT
Start: 2025-05-15 | End: 2025-06-12

## 2025-05-19 DIAGNOSIS — K21.9 GASTROESOPHAGEAL REFLUX DISEASE WITHOUT ESOPHAGITIS: ICD-10-CM

## 2025-05-19 RX ORDER — PANTOPRAZOLE SODIUM 40 MG/1
40 TABLET, DELAYED RELEASE ORAL DAILY
Qty: 90 TABLET | Refills: 1 | Status: SHIPPED | OUTPATIENT
Start: 2025-05-19

## 2025-05-19 ASSESSMENT — ENCOUNTER SYMPTOMS
FEVER: 0
SORE THROAT: 0
ANOREXIA: 0
DIARRHEA: 0
FATIGUE: 0
NAIL CHANGES: 0
COUGH: 0
SHORTNESS OF BREATH: 0
VOMITING: 0
EYE PAIN: 0
RHINORRHEA: 0

## 2025-05-19 NOTE — TELEPHONE ENCOUNTER
Fax pharmacy for med refills on    Pantoprazole 40mg    LOV: 05/02/25  NOV: 07/30/25    [facility]acy: [facility]

## 2025-05-22 ENCOUNTER — TELEPHONE (OUTPATIENT)
Dept: HOME HEALTH SERVICES | Facility: HOME HEALTH | Age: OVER 89
End: 2025-05-22

## 2025-05-22 ENCOUNTER — OFFICE VISIT (OUTPATIENT)
Dept: PRIMARY CARE | Facility: CLINIC | Age: OVER 89
End: 2025-05-22
Payer: MEDICARE

## 2025-05-22 VITALS
HEART RATE: 67 BPM | WEIGHT: 220.6 LBS | OXYGEN SATURATION: 94 % | SYSTOLIC BLOOD PRESSURE: 115 MMHG | TEMPERATURE: 97.3 F | DIASTOLIC BLOOD PRESSURE: 75 MMHG | BODY MASS INDEX: 34.55 KG/M2

## 2025-05-22 DIAGNOSIS — L98.9 FACIAL LESION: Primary | ICD-10-CM

## 2025-05-22 DIAGNOSIS — L03.116 CELLULITIS OF LEFT LEG: Primary | ICD-10-CM

## 2025-05-22 DIAGNOSIS — T14.8XXA BLISTER: ICD-10-CM

## 2025-05-22 PROCEDURE — 3074F SYST BP LT 130 MM HG: CPT | Performed by: STUDENT IN AN ORGANIZED HEALTH CARE EDUCATION/TRAINING PROGRAM

## 2025-05-22 PROCEDURE — 1159F MED LIST DOCD IN RCRD: CPT | Performed by: STUDENT IN AN ORGANIZED HEALTH CARE EDUCATION/TRAINING PROGRAM

## 2025-05-22 PROCEDURE — 1036F TOBACCO NON-USER: CPT | Performed by: STUDENT IN AN ORGANIZED HEALTH CARE EDUCATION/TRAINING PROGRAM

## 2025-05-22 PROCEDURE — 99213 OFFICE O/P EST LOW 20 MIN: CPT | Performed by: STUDENT IN AN ORGANIZED HEALTH CARE EDUCATION/TRAINING PROGRAM

## 2025-05-22 PROCEDURE — 1160F RVW MEDS BY RX/DR IN RCRD: CPT | Performed by: STUDENT IN AN ORGANIZED HEALTH CARE EDUCATION/TRAINING PROGRAM

## 2025-05-22 PROCEDURE — 3078F DIAST BP <80 MM HG: CPT | Performed by: STUDENT IN AN ORGANIZED HEALTH CARE EDUCATION/TRAINING PROGRAM

## 2025-05-22 RX ORDER — DOXYCYCLINE 100 MG/1
100 CAPSULE ORAL 2 TIMES DAILY
Qty: 20 CAPSULE | Refills: 0 | Status: SHIPPED | OUTPATIENT
Start: 2025-05-22 | End: 2025-06-01

## 2025-05-22 ASSESSMENT — DERMATOLOGY QUALITY OF LIFE (QOL) ASSESSMENT
RATE HOW BOTHERED YOU ARE BY EFFECTS OF YOUR SKIN PROBLEMS ON YOUR ACTIVITIES (EG, GOING OUT, ACCOMPLISHING WHAT YOU WANT, WORK ACTIVITIES OR YOUR RELATIONSHIPS WITH OTHERS): 0 - NEVER BOTHERED
RATE HOW EMOTIONALLY BOTHERED YOU ARE BY YOUR SKIN PROBLEM (FOR EXAMPLE, WORRY, EMBARRASSMENT, FRUSTRATION): 0 - NEVER BOTHERED
RATE HOW BOTHERED YOU ARE BY SYMPTOMS OF YOUR SKIN PROBLEM (EG, ITCHING, STINGING BURNING, HURTING OR SKIN IRRITATION): 0 - NEVER BOTHERED
RATE HOW EMOTIONALLY BOTHERED YOU ARE BY YOUR SKIN PROBLEM (FOR EXAMPLE, WORRY, EMBARRASSMENT, FRUSTRATION): 0 - NEVER BOTHERED
RATE HOW BOTHERED YOU ARE BY EFFECTS OF YOUR SKIN PROBLEMS ON YOUR ACTIVITIES (EG, GOING OUT, ACCOMPLISHING WHAT YOU WANT, WORK ACTIVITIES OR YOUR RELATIONSHIPS WITH OTHERS): 0 - NEVER BOTHERED
DATE THE QUALITY-OF-LIFE ASSESSMENT WAS COMPLETED: 67347
WHAT SINGLE SKIN CONDITION LISTED BELOW IS THE PATIENT ANSWERING THE QUALITY-OF-LIFE ASSESSMENT QUESTIONS ABOUT: DERMATITIS
RATE HOW BOTHERED YOU ARE BY SYMPTOMS OF YOUR SKIN PROBLEM (EG, ITCHING, STINGING BURNING, HURTING OR SKIN IRRITATION): 0 - NEVER BOTHERED
WHAT SINGLE SKIN CONDITION LISTED BELOW IS THE PATIENT ANSWERING THE QUALITY-OF-LIFE ASSESSMENT QUESTIONS ABOUT: DERMATITIS

## 2025-05-22 ASSESSMENT — ENCOUNTER SYMPTOMS
RHINORRHEA: 0
NAIL CHANGES: 0
VOMITING: 0
SHORTNESS OF BREATH: 0
DIARRHEA: 0
ANOREXIA: 0
FATIGUE: 0
EYE PAIN: 0
FEVER: 0
COUGH: 0
SORE THROAT: 0

## 2025-05-22 NOTE — TELEPHONE ENCOUNTER
Hello,    [facility] does not provide initial assessment of wounds if the patient is not currently active with us.

## 2025-05-22 NOTE — TELEPHONE ENCOUNTER
Hello,      [facility] requires specific wound care instructions for the patient. I.E. what type of dressings to use; what kind of topicals, cleansers, etc.; how does the MD want home care to treat the wounds and how often.     Also, please provide the name of a teachable CG or who will assistance in the home for wound care?        Please update/edit home care referral with above documentation. If this information is not received by 5/25/25 the referral will be closed due to insufficient documentation. If home care is still needed, the referral can be re-submitted for review once all the initial requested information is available.

## 2025-05-22 NOTE — TELEPHONE ENCOUNTER
I would like initial assessment by wound care and then formal recommendations based upon their evaluation

## 2025-05-22 NOTE — PROGRESS NOTES
Assessment/Plan   Assessment & Plan  Cellulitis of left leg    Orders:    doxycycline (Vibramycin) 100 mg capsule; Take 1 capsule (100 mg) by mouth 2 times a day for 10 days. Take with at least 8 ounces (large glass) of water, do not lie down for 30 minutes after    Referral to Home Care; Future    Blister    Orders:    Referral to Home Care; Future        Subjective   Patient ID: Mic Saunders is a 89 y.o. male who presents for Blister (Prescribed From cardiology: Is on torosimide 20 mg, had been cut to 10 mg from 40 mg. Feet swelled and was wearing compression socks that were tight when took them off blisters appeared on left leg and are not going away. Has been about a week. Candidate for home care/wound care????).  Rash  This is a new problem. The current episode started in the past 7 days. The problem has been gradually worsening since onset. The rash is characterized by blistering, pain, redness, swelling and draining. He was exposed to nothing. Pertinent negatives include no anorexia, congestion, cough, diarrhea, eye pain, facial edema, fatigue, fever, joint pain, nail changes, rhinorrhea, shortness of breath, sore throat or vomiting.     Patient's son was placing compression stockings last week and had tearing of the skin.   Review of Systems   Constitutional:  Negative for fatigue and fever.   HENT:  Negative for congestion, rhinorrhea and sore throat.    Eyes:  Negative for pain.   Respiratory:  Negative for cough and shortness of breath.    Gastrointestinal:  Negative for anorexia, diarrhea and vomiting.   Musculoskeletal:  Negative for joint pain.   Skin:  Positive for rash. Negative for nail changes.       Objective   Physical Exam  Skin:     Findings: Rash present.             Riky Coronel MD 05/22/25 11:22 AM

## 2025-05-25 ENCOUNTER — TELEPHONE (OUTPATIENT)
Dept: HOME HEALTH SERVICES | Facility: HOME HEALTH | Age: OVER 89
End: 2025-05-25
Payer: MEDICARE

## 2025-05-25 DIAGNOSIS — T14.8XXA BLISTER: Primary | ICD-10-CM

## 2025-05-25 NOTE — TELEPHONE ENCOUNTER
This referral has been made a Non Admit with  Home Care due to Insufficient Documentation. If you have further questions, feel free to reach out to our office at [phone].     Thank you, [facility] Intake.

## 2025-05-27 NOTE — TELEPHONE ENCOUNTER
They denied request for home health wound care, I could place referral to outpatient wound care clinic instead

## 2025-05-28 ENCOUNTER — APPOINTMENT (OUTPATIENT)
Dept: DERMATOLOGY | Facility: CLINIC | Age: OVER 89
End: 2025-05-28
Payer: MEDICARE

## 2025-05-28 DIAGNOSIS — L81.4 LENTIGO: ICD-10-CM

## 2025-05-28 DIAGNOSIS — L82.1 SEBORRHEIC KERATOSIS: ICD-10-CM

## 2025-05-28 DIAGNOSIS — D48.5 NEOPLASM OF UNCERTAIN BEHAVIOR OF SKIN: Primary | ICD-10-CM

## 2025-05-28 DIAGNOSIS — Z85.820 PERSONAL HISTORY OF MALIGNANT MELANOMA OF SKIN: ICD-10-CM

## 2025-05-28 PROCEDURE — 1160F RVW MEDS BY RX/DR IN RCRD: CPT | Performed by: DERMATOLOGY

## 2025-05-28 PROCEDURE — 99203 OFFICE O/P NEW LOW 30 MIN: CPT | Performed by: DERMATOLOGY

## 2025-05-28 PROCEDURE — 1036F TOBACCO NON-USER: CPT | Performed by: DERMATOLOGY

## 2025-05-28 PROCEDURE — 1159F MED LIST DOCD IN RCRD: CPT | Performed by: DERMATOLOGY

## 2025-05-28 PROCEDURE — 11312 SHAVE SKIN LESION 1.1-2.0 CM: CPT | Performed by: DERMATOLOGY

## 2025-05-28 NOTE — Clinical Note
Lymph node basins palpated in relevant regions without appreciable adenopathy; regions include the neck and/or supraclavicular and/or axillary and/or inguinal and/or popliteal skin.

## 2025-05-28 NOTE — TELEPHONE ENCOUNTER
Spoke to britney reilly's son (on HIPAA)   Would like that referral to outpatient wound care clinic.

## 2025-05-28 NOTE — PROGRESS NOTES
"Subjective     Mic Saunders is a 89 y.o. male who presents for the following: Suspicious Skin Lesion (Personal history of MM- R shoulder. Accompanied by Denny Saunders- son. Pt's states no discomfort or treatments to site. Pt's son states pt \"knocks off\" site over time. ).     Review of Systems:  No other skin or systemic complaints other than what is documented elsewhere in the note.    The following portions of the chart were reviewed this encounter and updated as appropriate:   Tobacco  Allergies  Meds  Problems  Med Hx  Surg Hx  Fam Hx         Skin Cancer History  Biopsy Log Book  No skin cancers from Specimen Tracking.    Additional History  Malignant Melanoma, R shoulder, per patient, no records in Epic         Objective   Well appearing patient in no apparent distress; mood and affect are within normal limits.    A focused skin examination was performed. All findings within normal limits unless otherwise noted below.    Assessment/Plan   Skin Exam  1. NEOPLASM OF UNCERTAIN BEHAVIOR OF SKIN  Left Buccal Cheek  1.1cm erythematous hyperkeratotic plaque    Shave removal    Lesion diameter (cm):  1.1  Informed consent: discussed and consent obtained    Timeout: patient name, date of birth, surgical site, and procedure verified    Procedure prep:  Patient was prepped and draped  Anesthesia: the lesion was anesthetized in a standard fashion    Anesthetic:  1% lidocaine w/ epinephrine 1-100,000 local infiltration  Instrument used: DermaBlade    Hemostasis achieved with: aluminum chloride    Outcome: patient tolerated procedure well    Post-procedure details: sterile dressing applied and wound care instructions given    Dressing type: bandage and petrolatum      Staff Communication: Dermatology Local Anesthesia: Site Location: L buccal cheek 1 % Lidocaine / Epinephrine - Amount: 0.5cc  Specimen 1 - Dermatopathology- DERM LAB  Differential Diagnosis: r/o SCC   Check Margins Yes/No?:    Comments:    Dermpath Lab: " Routine Histopathology (formalin-fixed tissue)  2. LENTIGO  Head - Anterior (Face)  Tan macules  -Benign appearing on exam  -Reassurance, recommend observation  3. SEBORRHEIC KERATOSIS  Head - Anterior (Face)  Stuck on, waxy macule(s)/papule(s)/plaque(s) with comedo-like openings and milia like cysts  -Discussed the nature of the diagnosis  -Reassurance, recommend continued observation  4. PERSONAL HISTORY OF MALIGNANT MELANOMA OF SKIN  Generalized  Lymph node basins palpated in relevant regions without appreciable adenopathy; regions include the neck and/or supraclavicular and/or axillary and/or inguinal and/or popliteal skin.  Personal History of Malignant Melanoma, per patient, R shoulder  -Well healed scar(s) with no evidence of recurrence  -Discussed the need for regular full skin examinations in the office, and monthly self examinations at home  -Discussed the need for annual ophthalmology exams with dilation  -Discussed concerning lesions and when to return sooner if any changes noted in skin lesions. Patient verbalizes understanding.    Follow up as needed. The patient declines FSE.  Discussed if there are any changes or development of concerning symptoms (lesion/skin condition is changing, bleeding, enlarging, or worsening) the patient is to contact my office. The patient verbalizes understanding.    Beatrice Davis MD  5/28/2025

## 2025-05-28 NOTE — Clinical Note
Personal History of Malignant Melanoma, per patient, R shoulder  -Well healed scar(s) with no evidence of recurrence  -Discussed the need for regular full skin examinations in the office, and monthly self examinations at home  -Discussed the need for annual ophthalmology exams with dilation  -Discussed concerning lesions and when to return sooner if any changes noted in skin lesions. Patient verbalizes understanding.

## 2025-05-30 LAB
LABORATORY COMMENT REPORT: NORMAL
PATH REPORT.FINAL DX SPEC: NORMAL
PATH REPORT.GROSS SPEC: NORMAL
PATH REPORT.MICROSCOPIC SPEC OTHER STN: NORMAL
PATH REPORT.RELEVANT HX SPEC: NORMAL
PATH REPORT.TOTAL CANCER: NORMAL

## 2025-05-30 NOTE — RESULT ENCOUNTER NOTE
RN called pt and pt's son, Denny, answered the phone. Per communication form, able to relay information to pt's son. RN notified pt's son the biopsy is a benign result; a seborrheic keratosis (a benign growth) that is inflamed an no further treatment is needed. Patient's son verbalized understanding and has no further questions or concerns.     Belinda AMESN RN

## 2025-06-05 ENCOUNTER — OFFICE VISIT (OUTPATIENT)
Dept: WOUND CARE | Facility: CLINIC | Age: OVER 89
End: 2025-06-05
Payer: MEDICARE

## 2025-06-05 PROCEDURE — 99213 OFFICE O/P EST LOW 20 MIN: CPT

## 2025-06-12 ENCOUNTER — APPOINTMENT (OUTPATIENT)
Dept: WOUND CARE | Facility: CLINIC | Age: OVER 89
End: 2025-06-12
Payer: MEDICARE

## 2025-07-30 ENCOUNTER — APPOINTMENT (OUTPATIENT)
Dept: PRIMARY CARE | Facility: CLINIC | Age: OVER 89
End: 2025-07-30
Payer: MEDICARE

## 2025-07-30 VITALS
BODY MASS INDEX: 34.93 KG/M2 | OXYGEN SATURATION: 94 % | HEART RATE: 71 BPM | SYSTOLIC BLOOD PRESSURE: 106 MMHG | DIASTOLIC BLOOD PRESSURE: 62 MMHG | WEIGHT: 223 LBS | TEMPERATURE: 97.3 F

## 2025-07-30 DIAGNOSIS — I10 BENIGN ESSENTIAL HTN: ICD-10-CM

## 2025-07-30 DIAGNOSIS — R60.0 EDEMA OF BOTH LEGS: ICD-10-CM

## 2025-07-30 DIAGNOSIS — M79.89 LEG SWELLING: ICD-10-CM

## 2025-07-30 DIAGNOSIS — I87.2 VENOUS STASIS DERMATITIS OF BOTH LOWER EXTREMITIES: ICD-10-CM

## 2025-07-30 DIAGNOSIS — E87.1 HYPONATREMIA: ICD-10-CM

## 2025-07-30 DIAGNOSIS — S81.802A WOUND OF LEFT LOWER EXTREMITY, INITIAL ENCOUNTER: Primary | ICD-10-CM

## 2025-07-30 DIAGNOSIS — E03.9 HYPOTHYROIDISM (ACQUIRED): ICD-10-CM

## 2025-07-30 DIAGNOSIS — K21.9 GASTROESOPHAGEAL REFLUX DISEASE WITHOUT ESOPHAGITIS: ICD-10-CM

## 2025-07-30 DIAGNOSIS — I89.0 LYMPHEDEMA OF RIGHT ARM: ICD-10-CM

## 2025-07-30 DIAGNOSIS — I44.2 COMPLETE HEART BLOCK: ICD-10-CM

## 2025-07-30 DIAGNOSIS — J44.9 CHRONIC OBSTRUCTIVE PULMONARY DISEASE, UNSPECIFIED COPD TYPE (MULTI): ICD-10-CM

## 2025-07-30 DIAGNOSIS — E78.5 HYPERLIPIDEMIA, UNSPECIFIED HYPERLIPIDEMIA TYPE: ICD-10-CM

## 2025-07-30 DIAGNOSIS — D64.9 ANEMIA, UNSPECIFIED TYPE: ICD-10-CM

## 2025-07-30 DIAGNOSIS — N18.31 CHRONIC KIDNEY DISEASE, STAGE 3A (MULTI): ICD-10-CM

## 2025-07-30 DIAGNOSIS — I50.9 CONGESTIVE HEART FAILURE, UNSPECIFIED HF CHRONICITY, UNSPECIFIED HEART FAILURE TYPE: ICD-10-CM

## 2025-07-30 DIAGNOSIS — G47.33 OBSTRUCTIVE SLEEP APNEA: ICD-10-CM

## 2025-07-30 PROCEDURE — 99214 OFFICE O/P EST MOD 30 MIN: CPT | Performed by: NURSE PRACTITIONER

## 2025-07-30 PROCEDURE — 3074F SYST BP LT 130 MM HG: CPT | Performed by: NURSE PRACTITIONER

## 2025-07-30 PROCEDURE — 3078F DIAST BP <80 MM HG: CPT | Performed by: NURSE PRACTITIONER

## 2025-07-30 PROCEDURE — 1159F MED LIST DOCD IN RCRD: CPT | Performed by: NURSE PRACTITIONER

## 2025-07-30 PROCEDURE — 1160F RVW MEDS BY RX/DR IN RCRD: CPT | Performed by: NURSE PRACTITIONER

## 2025-07-30 RX ORDER — DOXYCYCLINE 100 MG/1
1 CAPSULE ORAL
COMMUNITY
Start: 2025-07-21

## 2025-07-30 RX ORDER — CLOTRIMAZOLE AND BETAMETHASONE DIPROPIONATE 10; .64 MG/G; MG/G
CREAM TOPICAL
COMMUNITY
Start: 2025-07-02

## 2025-07-30 NOTE — PROGRESS NOTES
Subjective   Mic Saunders is a 89 y.o. male who presents for 6 mon fu.    HPI  He presents to  the office today for a 6 month follow up and medication refills.   He reports he is having a hard time sleeping. Feels he is exhausted.  Son reports he sleeps all day.  His CPAP machine is not working. He has an appt with pulmonology in a couple weeks.   He requested a medication to help with sleep but I advised against this was not a safe option given SHAY which is currently untreated due to broken machine and he is also very sleepy and has unsteady gait.  He is tolerating medications well at current dose- no side effects. No chest pain, palpitations  (+) SOB with exertion- doing the stairs and also showering causes SOB and is exhausted.  (+) edema but overall better  Developed a blister on the left shin about 2 weeks ago.  Reached out to cardiology and they started Doxycyline.  The blister has opened now.  No fever or chills.  (+) sneezing fits at times  (+) wheezing at times.    (+) mild headaches at times  No new numbness, tingling, weakness or vision changes.  (+) dizziness when he stands up.   No abdominal pain, nausea, vomiting or diarrhea.  No constipation but does take Metamucil.  Home blood pressure readings: No home BP readings.  Last eye exam: usually goes once a year.   Diet: Eating healthier      Last labs: ordered today.    Review of Systems   Constitutional:  Positive for fatigue. Negative for chills and fever.   HENT:  Positive for sneezing.    Respiratory:  Positive for shortness of breath. Negative for chest tightness.    Cardiovascular:  Positive for leg swelling. Negative for chest pain and palpitations.   Gastrointestinal:  Negative for abdominal pain, constipation, diarrhea, nausea and vomiting.   Skin:  Positive for wound.   Neurological:  Negative for dizziness, weakness, numbness and headaches.     Objective   /62 (BP Location: Left arm, Patient Position: Sitting)   Pulse 71   Temp 36.3 °C  (97.3 °F) (Temporal)   Wt 101 kg (223 lb)   SpO2 94%   BMI 34.93 kg/m²     Physical Exam  Constitutional:       General: He is not in acute distress.     Appearance: He is obese. He is not toxic-appearing.     Eyes:      Extraocular Movements: Extraocular movements intact.      Conjunctiva/sclera: Conjunctivae normal.      Pupils: Pupils are equal, round, and reactive to light.       Cardiovascular:      Rate and Rhythm: Normal rate and regular rhythm.      Pulses: Normal pulses.      Heart sounds: Normal heart sounds, S1 normal and S2 normal. No murmur heard.  Pulmonary:      Effort: Pulmonary effort is normal. No accessory muscle usage or respiratory distress.      Breath sounds: Normal breath sounds and air entry.   Abdominal:      General: Bowel sounds are normal.      Palpations: Abdomen is soft.      Tenderness: There is no abdominal tenderness.     Musculoskeletal:      Right lower leg: Edema present.      Left lower leg: Edema present.   Lymphadenopathy:      Cervical: No cervical adenopathy.     Skin:     Comments: Left medial lower leg with open area. Few scattered small vesicles noted as well.   No active drainage.  (+) redness noted to anterior shins on both legs in same distribution. No significant warmth noted.     Neurological:      Mental Status: He is alert and oriented to person, place, and time.     Psychiatric:         Attention and Perception: Attention normal.         Mood and Affect: Mood and affect normal.         Behavior: Behavior normal. Behavior is cooperative.         Thought Content: Thought content normal.         Cognition and Memory: Cognition normal.         Judgment: Judgment normal.         Assessment/Plan   Problem List Items Addressed This Visit       Anemia    Normal on most recent labs- recheck.         Benign essential HTN    Well-controlled on the current medications. Concerned it may be too low now that he is also on diuretics. Check labs- may consider lowering his dose  of Losartan.         CHF (congestive heart failure)    Clinically stable. Check updated renal function.         Relevant Orders    Comprehensive Metabolic Panel    CBC and Auto Differential    Edema of both legs    Improved since adding diuretics.         GERD (gastroesophageal reflux disease)    Stable on the current dose of Pantoprazole.  Continue.             Hyperlipidemia    He stopped the statin on his own due to concern for side effects.         Hypothyroidism (acquired)    Continue the current dose of levothyroxine.  Check updated TSH.             Relevant Orders    TSH with reflex to Free T4 if abnormal    Lymphedema of right arm    Significant but he is not interested in PT for lymphedema.         Obstructive sleep apnea    Follows with pulmonology.  Compliant with CPAP machine but currently broken.          Chronic obstructive pulmonary disease (Multi)    He has not been using his inhaler.  Recommend he use his inhaler (Trelegy) regularly.  He should follow-up with pulmonology as planned.         Hyponatremia    Better on recent labs.         Leg swelling    Improved since adding diuretics.         Complete heart block    Status post permanent pacemaker placement.  Follows with cardiology.           Chronic kidney disease, stage 3a (Multi)    Check updated renal function.          Other Visit Diagnoses         Wound of left lower extremity, initial encounter    -  Primary    Relevant Orders    Referral to Wound Clinic      Venous stasis dermatitis of both lower extremities        Relevant Orders    Referral to Wound Clinic        Recommended to finish the Doxycycline. Advised to keep open area clean and covered during the day. Recommended evaluation at wound care center.    It has been a pleasure seeing you today!

## 2025-07-31 PROBLEM — H60.551 ACUTE REACTIVE OTITIS EXTERNA OF RIGHT EAR: Status: RESOLVED | Noted: 2024-06-19 | Resolved: 2025-07-31

## 2025-07-31 ASSESSMENT — ENCOUNTER SYMPTOMS
WEAKNESS: 0
DIZZINESS: 0
CONSTIPATION: 0
NAUSEA: 0
PALPITATIONS: 0
NUMBNESS: 0
ABDOMINAL PAIN: 0
CHILLS: 0
CHEST TIGHTNESS: 0
SHORTNESS OF BREATH: 1
DIARRHEA: 0
HEADACHES: 0
FEVER: 0
WOUND: 1
FATIGUE: 1
VOMITING: 0

## 2025-07-31 NOTE — ASSESSMENT & PLAN NOTE
Well-controlled on the current medications. Concerned it may be too low now that he is also on diuretics. Check labs- may consider lowering his dose of Losartan.

## 2025-07-31 NOTE — ASSESSMENT & PLAN NOTE
He has not been using his inhaler.  Recommend he use his inhaler (Trelegy) regularly.  He should follow-up with pulmonology as planned.

## 2025-08-05 ENCOUNTER — APPOINTMENT (OUTPATIENT)
Dept: WOUND CARE | Facility: CLINIC | Age: OVER 89
End: 2025-08-05
Payer: MEDICARE

## 2025-08-11 ENCOUNTER — APPOINTMENT (OUTPATIENT)
Dept: AUDIOLOGY | Facility: CLINIC | Age: OVER 89
End: 2025-08-11
Payer: MEDICARE

## 2025-08-11 ENCOUNTER — APPOINTMENT (OUTPATIENT)
Dept: OTOLARYNGOLOGY | Facility: CLINIC | Age: OVER 89
End: 2025-08-11
Payer: MEDICARE

## 2025-08-11 DIAGNOSIS — R42 VERTIGO: ICD-10-CM

## 2025-08-11 DIAGNOSIS — H90.3 ASYMMETRIC SNHL (SENSORINEURAL HEARING LOSS): Primary | ICD-10-CM

## 2025-08-11 DIAGNOSIS — H90.3 SENSORINEURAL HEARING LOSS (SNHL) OF BOTH EARS: Primary | ICD-10-CM

## 2025-08-11 PROCEDURE — 99214 OFFICE O/P EST MOD 30 MIN: CPT | Performed by: STUDENT IN AN ORGANIZED HEALTH CARE EDUCATION/TRAINING PROGRAM

## 2025-08-11 PROCEDURE — 92557 COMPREHENSIVE HEARING TEST: CPT | Performed by: AUDIOLOGIST

## 2025-08-11 PROCEDURE — 92567 TYMPANOMETRY: CPT | Performed by: AUDIOLOGIST

## 2025-08-11 PROCEDURE — 1159F MED LIST DOCD IN RCRD: CPT | Performed by: STUDENT IN AN ORGANIZED HEALTH CARE EDUCATION/TRAINING PROGRAM

## 2026-01-30 ENCOUNTER — APPOINTMENT (OUTPATIENT)
Dept: PRIMARY CARE | Facility: CLINIC | Age: OVER 89
End: 2026-01-30
Payer: MEDICARE

## 2026-03-05 ENCOUNTER — APPOINTMENT (OUTPATIENT)
Dept: UROLOGY | Facility: CLINIC | Age: OVER 89
End: 2026-03-05
Payer: MEDICARE

## 2026-08-12 ENCOUNTER — APPOINTMENT (OUTPATIENT)
Dept: AUDIOLOGY | Facility: CLINIC | Age: OVER 89
End: 2026-08-12
Payer: MEDICARE

## 2026-08-12 ENCOUNTER — APPOINTMENT (OUTPATIENT)
Dept: OTOLARYNGOLOGY | Facility: CLINIC | Age: OVER 89
End: 2026-08-12
Payer: MEDICARE